# Patient Record
Sex: MALE | Race: WHITE | Employment: OTHER | URBAN - METROPOLITAN AREA
[De-identification: names, ages, dates, MRNs, and addresses within clinical notes are randomized per-mention and may not be internally consistent; named-entity substitution may affect disease eponyms.]

---

## 2019-01-01 ENCOUNTER — APPOINTMENT (OUTPATIENT)
Dept: GENERAL RADIOLOGY | Facility: HOSPITAL | Age: 84
DRG: 243 | End: 2019-01-01
Attending: INTERNAL MEDICINE
Payer: MEDICARE

## 2019-01-01 ENCOUNTER — LAB ENCOUNTER (OUTPATIENT)
Dept: LAB | Age: 84
End: 2019-01-01
Attending: UROLOGY
Payer: MEDICARE

## 2019-01-01 ENCOUNTER — SNF ADMIT/H&P (OUTPATIENT)
Dept: INTERNAL MEDICINE CLINIC | Facility: SKILLED NURSING FACILITY | Age: 84
End: 2019-01-01

## 2019-01-01 ENCOUNTER — SNF VISIT (OUTPATIENT)
Dept: INTERNAL MEDICINE CLINIC | Facility: SKILLED NURSING FACILITY | Age: 84
End: 2019-01-01

## 2019-01-01 ENCOUNTER — APPOINTMENT (OUTPATIENT)
Dept: MRI IMAGING | Facility: HOSPITAL | Age: 84
DRG: 243 | End: 2019-01-01
Attending: UROLOGY
Payer: MEDICARE

## 2019-01-01 ENCOUNTER — ANESTHESIA EVENT (OUTPATIENT)
Dept: SURGERY | Facility: HOSPITAL | Age: 84
DRG: 657 | End: 2019-01-01
Payer: MEDICARE

## 2019-01-01 ENCOUNTER — APPOINTMENT (OUTPATIENT)
Dept: CV DIAGNOSTICS | Facility: HOSPITAL | Age: 84
DRG: 243 | End: 2019-01-01
Attending: INTERNAL MEDICINE
Payer: MEDICARE

## 2019-01-01 ENCOUNTER — ANESTHESIA (OUTPATIENT)
Dept: SURGERY | Facility: HOSPITAL | Age: 84
DRG: 243 | End: 2019-01-01
Payer: MEDICARE

## 2019-01-01 ENCOUNTER — APPOINTMENT (OUTPATIENT)
Dept: INTERVENTIONAL RADIOLOGY/VASCULAR | Facility: HOSPITAL | Age: 84
DRG: 243 | End: 2019-01-01
Attending: INTERNAL MEDICINE
Payer: MEDICARE

## 2019-01-01 ENCOUNTER — APPOINTMENT (OUTPATIENT)
Dept: CT IMAGING | Facility: HOSPITAL | Age: 84
DRG: 243 | End: 2019-01-01
Attending: INTERNAL MEDICINE
Payer: MEDICARE

## 2019-01-01 ENCOUNTER — HOSPITAL ENCOUNTER (INPATIENT)
Facility: HOSPITAL | Age: 84
LOS: 3 days | Discharge: NURSING, REHAB OR LONG-TERM CARE | DRG: 657 | End: 2019-01-01
Attending: UROLOGY | Admitting: UROLOGY
Payer: MEDICARE

## 2019-01-01 ENCOUNTER — ANESTHESIA EVENT (OUTPATIENT)
Dept: SURGERY | Facility: HOSPITAL | Age: 84
DRG: 243 | End: 2019-01-01
Payer: MEDICARE

## 2019-01-01 ENCOUNTER — ANESTHESIA (OUTPATIENT)
Dept: SURGERY | Facility: HOSPITAL | Age: 84
DRG: 657 | End: 2019-01-01
Payer: MEDICARE

## 2019-01-01 ENCOUNTER — APPOINTMENT (OUTPATIENT)
Dept: GENERAL RADIOLOGY | Facility: HOSPITAL | Age: 84
DRG: 243 | End: 2019-01-01
Attending: UROLOGY
Payer: MEDICARE

## 2019-01-01 VITALS
DIASTOLIC BLOOD PRESSURE: 65 MMHG | RESPIRATION RATE: 18 BRPM | WEIGHT: 175 LBS | TEMPERATURE: 99 F | SYSTOLIC BLOOD PRESSURE: 114 MMHG | HEIGHT: 71 IN | OXYGEN SATURATION: 99 % | BODY MASS INDEX: 24.5 KG/M2 | HEART RATE: 62 BPM

## 2019-01-01 VITALS
OXYGEN SATURATION: 97 % | HEART RATE: 68 BPM | RESPIRATION RATE: 16 BRPM | HEIGHT: 69 IN | TEMPERATURE: 100 F | WEIGHT: 175.38 LBS | SYSTOLIC BLOOD PRESSURE: 102 MMHG | DIASTOLIC BLOOD PRESSURE: 65 MMHG | BODY MASS INDEX: 25.98 KG/M2

## 2019-01-01 DIAGNOSIS — R59.1 LYMPHADENOPATHY: Primary | ICD-10-CM

## 2019-01-01 DIAGNOSIS — D64.9 ANEMIA, UNSPECIFIED TYPE: ICD-10-CM

## 2019-01-01 DIAGNOSIS — C68.9 UROTHELIAL CANCER (HCC): ICD-10-CM

## 2019-01-01 DIAGNOSIS — C91.10 CLL (CHRONIC LYMPHOCYTIC LEUKEMIA) (HCC): ICD-10-CM

## 2019-01-01 DIAGNOSIS — I95.1 ORTHOSTATIC HYPOTENSION: ICD-10-CM

## 2019-01-01 DIAGNOSIS — C64.2 MALIGNANT NEOPLASM OF LEFT KIDNEY (HCC): ICD-10-CM

## 2019-01-01 DIAGNOSIS — R53.1 WEAKNESS: ICD-10-CM

## 2019-01-01 DIAGNOSIS — C64.2 RENAL CARCINOMA, LEFT (HCC): ICD-10-CM

## 2019-01-01 DIAGNOSIS — Z90.5 S/P NEPHRECTOMY: ICD-10-CM

## 2019-01-01 DIAGNOSIS — R53.1 WEAKNESS GENERALIZED: ICD-10-CM

## 2019-01-01 PROCEDURE — 80048 BASIC METABOLIC PNL TOTAL CA: CPT | Performed by: UROLOGY

## 2019-01-01 PROCEDURE — 97161 PT EVAL LOW COMPLEX 20 MIN: CPT

## 2019-01-01 PROCEDURE — 71045 X-RAY EXAM CHEST 1 VIEW: CPT | Performed by: INTERNAL MEDICINE

## 2019-01-01 PROCEDURE — 85027 COMPLETE CBC AUTOMATED: CPT | Performed by: UROLOGY

## 2019-01-01 PROCEDURE — 82607 VITAMIN B-12: CPT | Performed by: INTERNAL MEDICINE

## 2019-01-01 PROCEDURE — 99310 SBSQ NF CARE HIGH MDM 45: CPT | Performed by: NURSE PRACTITIONER

## 2019-01-01 PROCEDURE — 74183 MRI ABD W/O CNTR FLWD CNTR: CPT | Performed by: UROLOGY

## 2019-01-01 PROCEDURE — 99308 SBSQ NF CARE LOW MDM 20: CPT | Performed by: NURSE PRACTITIONER

## 2019-01-01 PROCEDURE — 85018 HEMOGLOBIN: CPT | Performed by: UROLOGY

## 2019-01-01 PROCEDURE — 85014 HEMATOCRIT: CPT | Performed by: UROLOGY

## 2019-01-01 PROCEDURE — 83010 ASSAY OF HAPTOGLOBIN QUANT: CPT | Performed by: INTERNAL MEDICINE

## 2019-01-01 PROCEDURE — 88342 IMHCHEM/IMCYTCHM 1ST ANTB: CPT | Performed by: UROLOGY

## 2019-01-01 PROCEDURE — 82728 ASSAY OF FERRITIN: CPT | Performed by: INTERNAL MEDICINE

## 2019-01-01 PROCEDURE — 30233R1 TRANSFUSION OF NONAUTOLOGOUS PLATELETS INTO PERIPHERAL VEIN, PERCUTANEOUS APPROACH: ICD-10-PCS | Performed by: INTERNAL MEDICINE

## 2019-01-01 PROCEDURE — 02HK3JZ INSERTION OF PACEMAKER LEAD INTO RIGHT VENTRICLE, PERCUTANEOUS APPROACH: ICD-10-PCS | Performed by: INTERNAL MEDICINE

## 2019-01-01 PROCEDURE — 88307 TISSUE EXAM BY PATHOLOGIST: CPT | Performed by: UROLOGY

## 2019-01-01 PROCEDURE — BT1F1ZZ FLUOROSCOPY OF LEFT KIDNEY, URETER AND BLADDER USING LOW OSMOLAR CONTRAST: ICD-10-PCS | Performed by: UROLOGY

## 2019-01-01 PROCEDURE — 97116 GAIT TRAINING THERAPY: CPT

## 2019-01-01 PROCEDURE — 99309 SBSQ NF CARE MODERATE MDM 30: CPT | Performed by: NURSE PRACTITIONER

## 2019-01-01 PROCEDURE — 86900 BLOOD TYPING SEROLOGIC ABO: CPT | Performed by: UROLOGY

## 2019-01-01 PROCEDURE — 38221 DX BONE MARROW BIOPSIES: CPT | Performed by: INTERNAL MEDICINE

## 2019-01-01 PROCEDURE — 93308 TTE F-UP OR LMTD: CPT | Performed by: INTERNAL MEDICINE

## 2019-01-01 PROCEDURE — 99232 SBSQ HOSP IP/OBS MODERATE 35: CPT | Performed by: INTERNAL MEDICINE

## 2019-01-01 PROCEDURE — 88184 FLOWCYTOMETRY/ TC 1 MARKER: CPT

## 2019-01-01 PROCEDURE — 36415 COLL VENOUS BLD VENIPUNCTURE: CPT | Performed by: UROLOGY

## 2019-01-01 PROCEDURE — 0TT14ZZ RESECTION OF LEFT KIDNEY, PERCUTANEOUS ENDOSCOPIC APPROACH: ICD-10-PCS | Performed by: UROLOGY

## 2019-01-01 PROCEDURE — 0T778DZ DILATION OF LEFT URETER WITH INTRALUMINAL DEVICE, VIA NATURAL OR ARTIFICIAL OPENING ENDOSCOPIC: ICD-10-PCS | Performed by: UROLOGY

## 2019-01-01 PROCEDURE — 36430 TRANSFUSION BLD/BLD COMPNT: CPT

## 2019-01-01 PROCEDURE — 88305 TISSUE EXAM BY PATHOLOGIST: CPT | Performed by: UROLOGY

## 2019-01-01 PROCEDURE — 0TB48ZX EXCISION OF LEFT KIDNEY PELVIS, VIA NATURAL OR ARTIFICIAL OPENING ENDOSCOPIC, DIAGNOSTIC: ICD-10-PCS | Performed by: UROLOGY

## 2019-01-01 PROCEDURE — 99152 MOD SED SAME PHYS/QHP 5/>YRS: CPT | Performed by: INTERNAL MEDICINE

## 2019-01-01 PROCEDURE — 88341 IMHCHEM/IMCYTCHM EA ADD ANTB: CPT | Performed by: UROLOGY

## 2019-01-01 PROCEDURE — 97530 THERAPEUTIC ACTIVITIES: CPT

## 2019-01-01 PROCEDURE — 82232 ASSAY OF BETA-2 PROTEIN: CPT | Performed by: INTERNAL MEDICINE

## 2019-01-01 PROCEDURE — 8E0W4CZ ROBOTIC ASSISTED PROCEDURE OF TRUNK REGION, PERCUTANEOUS ENDOSCOPIC APPROACH: ICD-10-PCS | Performed by: UROLOGY

## 2019-01-01 PROCEDURE — 83540 ASSAY OF IRON: CPT | Performed by: INTERNAL MEDICINE

## 2019-01-01 PROCEDURE — 079T3ZX DRAINAGE OF BONE MARROW, PERCUTANEOUS APPROACH, DIAGNOSTIC: ICD-10-PCS | Performed by: RADIOLOGY

## 2019-01-01 PROCEDURE — 0TP98DZ REMOVAL OF INTRALUMINAL DEVICE FROM URETER, VIA NATURAL OR ARTIFICIAL OPENING ENDOSCOPIC: ICD-10-PCS | Performed by: UROLOGY

## 2019-01-01 PROCEDURE — 77012 CT SCAN FOR NEEDLE BIOPSY: CPT | Performed by: INTERNAL MEDICINE

## 2019-01-01 PROCEDURE — 07DR3ZX EXTRACTION OF ILIAC BONE MARROW, PERCUTANEOUS APPROACH, DIAGNOSTIC: ICD-10-PCS | Performed by: RADIOLOGY

## 2019-01-01 PROCEDURE — 88108 CYTOPATH CONCENTRATE TECH: CPT | Performed by: UROLOGY

## 2019-01-01 PROCEDURE — 84466 ASSAY OF TRANSFERRIN: CPT | Performed by: INTERNAL MEDICINE

## 2019-01-01 PROCEDURE — 88237 TISSUE CULTURE BONE MARROW: CPT

## 2019-01-01 PROCEDURE — 86901 BLOOD TYPING SEROLOGIC RH(D): CPT | Performed by: UROLOGY

## 2019-01-01 PROCEDURE — 82746 ASSAY OF FOLIC ACID SERUM: CPT | Performed by: INTERNAL MEDICINE

## 2019-01-01 PROCEDURE — 88185 FLOWCYTOMETRY/TC ADD-ON: CPT

## 2019-01-01 PROCEDURE — 85018 HEMOGLOBIN: CPT | Performed by: INTERNAL MEDICINE

## 2019-01-01 PROCEDURE — 86850 RBC ANTIBODY SCREEN: CPT | Performed by: UROLOGY

## 2019-01-01 PROCEDURE — 0TP94DZ REMOVAL OF INTRALUMINAL DEVICE FROM URETER, PERCUTANEOUS ENDOSCOPIC APPROACH: ICD-10-PCS | Performed by: UROLOGY

## 2019-01-01 PROCEDURE — 36415 COLL VENOUS BLD VENIPUNCTURE: CPT

## 2019-01-01 PROCEDURE — 0JH604Z INSERTION OF PACEMAKER, SINGLE CHAMBER INTO CHEST SUBCUTANEOUS TISSUE AND FASCIA, OPEN APPROACH: ICD-10-PCS | Performed by: INTERNAL MEDICINE

## 2019-01-01 PROCEDURE — 86920 COMPATIBILITY TEST SPIN: CPT

## 2019-01-01 DEVICE — STENT URET 7F 24CM TPR TIP: Type: IMPLANTABLE DEVICE | Site: URETER | Status: FUNCTIONAL

## 2019-01-01 RX ORDER — HYDROCODONE BITARTRATE AND ACETAMINOPHEN 5; 325 MG/1; MG/1
1 TABLET ORAL AS NEEDED
Status: DISCONTINUED | OUTPATIENT
Start: 2019-01-01 | End: 2019-01-01 | Stop reason: HOSPADM

## 2019-01-01 RX ORDER — SODIUM CHLORIDE 9 MG/ML
INJECTION, SOLUTION INTRAVENOUS ONCE
Status: COMPLETED | OUTPATIENT
Start: 2019-01-01 | End: 2019-01-01

## 2019-01-01 RX ORDER — LIDOCAINE HYDROCHLORIDE ANHYDROUS AND DEXTROSE MONOHYDRATE .8; 5 G/100ML; G/100ML
INJECTION, SOLUTION INTRAVENOUS CONTINUOUS
Status: DISCONTINUED | OUTPATIENT
Start: 2019-01-01 | End: 2019-01-01 | Stop reason: HOSPADM

## 2019-01-01 RX ORDER — SODIUM CHLORIDE 9 MG/ML
INJECTION, SOLUTION INTRAVENOUS
Status: COMPLETED
Start: 2019-01-01 | End: 2019-01-01

## 2019-01-01 RX ORDER — SODIUM CHLORIDE 9 MG/ML
INJECTION, SOLUTION INTRAVENOUS CONTINUOUS
Status: DISCONTINUED | OUTPATIENT
Start: 2019-01-01 | End: 2019-01-01

## 2019-01-01 RX ORDER — MORPHINE SULFATE 10 MG/ML
6 INJECTION, SOLUTION INTRAMUSCULAR; INTRAVENOUS EVERY 10 MIN PRN
Status: DISCONTINUED | OUTPATIENT
Start: 2019-01-01 | End: 2019-01-01 | Stop reason: HOSPADM

## 2019-01-01 RX ORDER — PROCHLORPERAZINE EDISYLATE 5 MG/ML
5 INJECTION INTRAMUSCULAR; INTRAVENOUS ONCE AS NEEDED
Status: DISCONTINUED | OUTPATIENT
Start: 2019-01-01 | End: 2019-01-01 | Stop reason: HOSPADM

## 2019-01-01 RX ORDER — SODIUM CHLORIDE, SODIUM LACTATE, POTASSIUM CHLORIDE, CALCIUM CHLORIDE 600; 310; 30; 20 MG/100ML; MG/100ML; MG/100ML; MG/100ML
INJECTION, SOLUTION INTRAVENOUS CONTINUOUS
Status: DISCONTINUED | OUTPATIENT
Start: 2019-01-01 | End: 2019-01-01 | Stop reason: HOSPADM

## 2019-01-01 RX ORDER — HYDROMORPHONE HYDROCHLORIDE 1 MG/ML
0.6 INJECTION, SOLUTION INTRAMUSCULAR; INTRAVENOUS; SUBCUTANEOUS EVERY 5 MIN PRN
Status: DISCONTINUED | OUTPATIENT
Start: 2019-01-01 | End: 2019-01-01 | Stop reason: HOSPADM

## 2019-01-01 RX ORDER — ONDANSETRON 2 MG/ML
INJECTION INTRAMUSCULAR; INTRAVENOUS AS NEEDED
Status: DISCONTINUED | OUTPATIENT
Start: 2019-01-01 | End: 2019-01-01 | Stop reason: SURG

## 2019-01-01 RX ORDER — ALFUZOSIN HYDROCHLORIDE 10 MG/1
10 TABLET, EXTENDED RELEASE ORAL
Status: DISCONTINUED | OUTPATIENT
Start: 2019-01-01 | End: 2019-01-01

## 2019-01-01 RX ORDER — PHENYLEPHRINE HCL 10 MG/ML
VIAL (ML) INJECTION AS NEEDED
Status: DISCONTINUED | OUTPATIENT
Start: 2019-01-01 | End: 2019-01-01 | Stop reason: SURG

## 2019-01-01 RX ORDER — HYDROMORPHONE HYDROCHLORIDE 1 MG/ML
0.2 INJECTION, SOLUTION INTRAMUSCULAR; INTRAVENOUS; SUBCUTANEOUS EVERY 5 MIN PRN
Status: DISCONTINUED | OUTPATIENT
Start: 2019-01-01 | End: 2019-01-01 | Stop reason: HOSPADM

## 2019-01-01 RX ORDER — ACETAMINOPHEN AND CODEINE PHOSPHATE 300; 30 MG/1; MG/1
2 TABLET ORAL EVERY 4 HOURS PRN
Status: DISCONTINUED | OUTPATIENT
Start: 2019-01-01 | End: 2019-01-01

## 2019-01-01 RX ORDER — CALCIUM CARBONATE 200(500)MG
1000 TABLET,CHEWABLE ORAL 3 TIMES DAILY PRN
Qty: 60 TABLET | Refills: 0 | Status: SHIPPED | OUTPATIENT
Start: 2019-01-01 | End: 2019-01-01

## 2019-01-01 RX ORDER — LIDOCAINE HYDROCHLORIDE 20 MG/ML
INJECTION, SOLUTION EPIDURAL; INFILTRATION; INTRACAUDAL; PERINEURAL AS NEEDED
Status: DISCONTINUED | OUTPATIENT
Start: 2019-01-01 | End: 2019-01-01 | Stop reason: SURG

## 2019-01-01 RX ORDER — CLINDAMYCIN PHOSPHATE 900 MG/50ML
INJECTION INTRAVENOUS
Status: COMPLETED
Start: 2019-01-01 | End: 2019-01-01

## 2019-01-01 RX ORDER — ONDANSETRON 2 MG/ML
4 INJECTION INTRAMUSCULAR; INTRAVENOUS ONCE AS NEEDED
Status: DISCONTINUED | OUTPATIENT
Start: 2019-01-01 | End: 2019-01-01 | Stop reason: HOSPADM

## 2019-01-01 RX ORDER — ACETAMINOPHEN AND CODEINE PHOSPHATE 300; 30 MG/1; MG/1
1 TABLET ORAL EVERY 4 HOURS PRN
Status: DISCONTINUED | OUTPATIENT
Start: 2019-01-01 | End: 2019-01-01

## 2019-01-01 RX ORDER — LIDOCAINE HYDROCHLORIDE 10 MG/ML
INJECTION, SOLUTION EPIDURAL; INFILTRATION; INTRACAUDAL; PERINEURAL AS NEEDED
Status: DISCONTINUED | OUTPATIENT
Start: 2019-01-01 | End: 2019-01-01 | Stop reason: SURG

## 2019-01-01 RX ORDER — CLINDAMYCIN PHOSPHATE 900 MG/50ML
900 INJECTION INTRAVENOUS ONCE
Status: COMPLETED | OUTPATIENT
Start: 2019-01-01 | End: 2019-01-01

## 2019-01-01 RX ORDER — EPHEDRINE SULFATE 50 MG/ML
INJECTION, SOLUTION INTRAVENOUS AS NEEDED
Status: DISCONTINUED | OUTPATIENT
Start: 2019-01-01 | End: 2019-01-01 | Stop reason: SURG

## 2019-01-01 RX ORDER — MORPHINE SULFATE 2 MG/ML
1 INJECTION, SOLUTION INTRAMUSCULAR; INTRAVENOUS EVERY 2 HOUR PRN
Status: DISCONTINUED | OUTPATIENT
Start: 2019-01-01 | End: 2019-01-01

## 2019-01-01 RX ORDER — NALOXONE HYDROCHLORIDE 0.4 MG/ML
80 INJECTION, SOLUTION INTRAMUSCULAR; INTRAVENOUS; SUBCUTANEOUS AS NEEDED
Status: DISCONTINUED | OUTPATIENT
Start: 2019-01-01 | End: 2019-01-01 | Stop reason: HOSPADM

## 2019-01-01 RX ORDER — HYDROMORPHONE HYDROCHLORIDE 1 MG/ML
0.4 INJECTION, SOLUTION INTRAMUSCULAR; INTRAVENOUS; SUBCUTANEOUS EVERY 5 MIN PRN
Status: DISCONTINUED | OUTPATIENT
Start: 2019-01-01 | End: 2019-01-01 | Stop reason: HOSPADM

## 2019-01-01 RX ORDER — MORPHINE SULFATE 4 MG/ML
4 INJECTION, SOLUTION INTRAMUSCULAR; INTRAVENOUS EVERY 10 MIN PRN
Status: DISCONTINUED | OUTPATIENT
Start: 2019-01-01 | End: 2019-01-01 | Stop reason: HOSPADM

## 2019-01-01 RX ORDER — MORPHINE SULFATE 2 MG/ML
2 INJECTION, SOLUTION INTRAMUSCULAR; INTRAVENOUS EVERY 10 MIN PRN
Status: DISCONTINUED | OUTPATIENT
Start: 2019-01-01 | End: 2019-01-01 | Stop reason: HOSPADM

## 2019-01-01 RX ORDER — MIDODRINE HYDROCHLORIDE 5 MG/1
5 TABLET ORAL 3 TIMES DAILY
Qty: 90 TABLET | Refills: 0 | Status: ON HOLD | OUTPATIENT
Start: 2019-01-01 | End: 2020-01-01

## 2019-01-01 RX ORDER — HALOPERIDOL 5 MG/ML
0.25 INJECTION INTRAMUSCULAR ONCE AS NEEDED
Status: DISCONTINUED | OUTPATIENT
Start: 2019-01-01 | End: 2019-01-01 | Stop reason: HOSPADM

## 2019-01-01 RX ORDER — MIDODRINE HYDROCHLORIDE 5 MG/1
2.5 TABLET ORAL 3 TIMES DAILY
Status: DISCONTINUED | OUTPATIENT
Start: 2019-01-01 | End: 2019-01-01

## 2019-01-01 RX ORDER — ONDANSETRON 2 MG/ML
4 INJECTION INTRAMUSCULAR; INTRAVENOUS EVERY 6 HOURS PRN
Status: DISCONTINUED | OUTPATIENT
Start: 2019-01-01 | End: 2019-01-01

## 2019-01-01 RX ORDER — MIDAZOLAM HYDROCHLORIDE 1 MG/ML
1 INJECTION INTRAMUSCULAR; INTRAVENOUS EVERY 5 MIN PRN
Status: DISCONTINUED | OUTPATIENT
Start: 2019-01-01 | End: 2019-01-01

## 2019-01-01 RX ORDER — LIDOCAINE HYDROCHLORIDE 20 MG/ML
JELLY TOPICAL AS NEEDED
Status: DISCONTINUED | OUTPATIENT
Start: 2019-01-01 | End: 2019-01-01 | Stop reason: HOSPADM

## 2019-01-01 RX ORDER — HYDROCODONE BITARTRATE AND ACETAMINOPHEN 5; 325 MG/1; MG/1
2 TABLET ORAL AS NEEDED
Status: DISCONTINUED | OUTPATIENT
Start: 2019-01-01 | End: 2019-01-01 | Stop reason: HOSPADM

## 2019-01-01 RX ORDER — HEPARIN SODIUM 5000 [USP'U]/ML
5000 INJECTION, SOLUTION INTRAVENOUS; SUBCUTANEOUS ONCE
Status: COMPLETED | OUTPATIENT
Start: 2019-01-01 | End: 2019-01-01

## 2019-01-01 RX ORDER — MORPHINE SULFATE 4 MG/ML
2 INJECTION, SOLUTION INTRAMUSCULAR; INTRAVENOUS EVERY 10 MIN PRN
Status: DISCONTINUED | OUTPATIENT
Start: 2019-01-01 | End: 2019-01-01 | Stop reason: HOSPADM

## 2019-01-01 RX ORDER — SODIUM CHLORIDE, SODIUM LACTATE, POTASSIUM CHLORIDE, CALCIUM CHLORIDE 600; 310; 30; 20 MG/100ML; MG/100ML; MG/100ML; MG/100ML
INJECTION, SOLUTION INTRAVENOUS CONTINUOUS PRN
Status: DISCONTINUED | OUTPATIENT
Start: 2019-01-01 | End: 2019-01-01 | Stop reason: SURG

## 2019-01-01 RX ORDER — MIDODRINE HYDROCHLORIDE 5 MG/1
5 TABLET ORAL 3 TIMES DAILY
Status: DISCONTINUED | OUTPATIENT
Start: 2019-01-01 | End: 2019-01-01

## 2019-01-01 RX ORDER — FAMOTIDINE 20 MG/1
20 TABLET ORAL ONCE
Status: DISCONTINUED | OUTPATIENT
Start: 2019-01-01 | End: 2019-01-01 | Stop reason: HOSPADM

## 2019-01-01 RX ORDER — METOCLOPRAMIDE 10 MG/1
10 TABLET ORAL ONCE
Status: DISCONTINUED | OUTPATIENT
Start: 2019-01-01 | End: 2019-01-01 | Stop reason: HOSPADM

## 2019-01-01 RX ORDER — BACITRACIN 50000 [USP'U]/1
INJECTION, POWDER, LYOPHILIZED, FOR SOLUTION INTRAMUSCULAR
Status: COMPLETED
Start: 2019-01-01 | End: 2019-01-01

## 2019-01-01 RX ORDER — PANTOPRAZOLE SODIUM 40 MG/1
40 TABLET, DELAYED RELEASE ORAL
Status: DISCONTINUED | OUTPATIENT
Start: 2019-01-01 | End: 2019-01-01

## 2019-01-01 RX ORDER — CLINDAMYCIN PHOSPHATE 900 MG/50ML
900 INJECTION INTRAVENOUS EVERY 8 HOURS
Status: COMPLETED | OUTPATIENT
Start: 2019-01-01 | End: 2019-01-01

## 2019-01-01 RX ORDER — CEFAZOLIN SODIUM/WATER 2 G/20 ML
SYRINGE (ML) INTRAVENOUS
Status: DISCONTINUED
Start: 2019-01-01 | End: 2019-01-01 | Stop reason: WASHOUT

## 2019-01-01 RX ORDER — ENOXAPARIN SODIUM 100 MG/ML
40 INJECTION SUBCUTANEOUS EVERY 24 HOURS
Status: DISCONTINUED | OUTPATIENT
Start: 2019-01-01 | End: 2019-01-01

## 2019-01-01 RX ORDER — MORPHINE SULFATE 2 MG/ML
1 INJECTION, SOLUTION INTRAMUSCULAR; INTRAVENOUS
Status: DISCONTINUED | OUTPATIENT
Start: 2019-01-01 | End: 2019-01-01

## 2019-01-01 RX ORDER — POLYETHYLENE GLYCOL 3350 17 G/17G
17 POWDER, FOR SOLUTION ORAL DAILY PRN
COMMUNITY
End: 2019-01-01

## 2019-01-01 RX ORDER — ROCURONIUM BROMIDE 10 MG/ML
INJECTION, SOLUTION INTRAVENOUS AS NEEDED
Status: DISCONTINUED | OUTPATIENT
Start: 2019-01-01 | End: 2019-01-01 | Stop reason: SURG

## 2019-01-01 RX ORDER — ATORVASTATIN CALCIUM 20 MG/1
20 TABLET, FILM COATED ORAL NIGHTLY
Status: DISCONTINUED | OUTPATIENT
Start: 2019-01-01 | End: 2019-01-01

## 2019-01-01 RX ORDER — MIDAZOLAM HYDROCHLORIDE 1 MG/ML
INJECTION INTRAMUSCULAR; INTRAVENOUS
Status: DISPENSED
Start: 2019-01-01 | End: 2019-01-01

## 2019-01-01 RX ORDER — FLUMAZENIL 0.1 MG/ML
0.2 INJECTION, SOLUTION INTRAVENOUS AS NEEDED
Status: DISCONTINUED | OUTPATIENT
Start: 2019-01-01 | End: 2019-01-01

## 2019-01-01 RX ORDER — SODIUM CHLORIDE 0.9 % (FLUSH) 0.9 %
10 SYRINGE (ML) INJECTION AS NEEDED
Status: DISCONTINUED | OUTPATIENT
Start: 2019-01-01 | End: 2019-01-01

## 2019-01-01 RX ORDER — ACETAMINOPHEN 325 MG/1
650 TABLET ORAL EVERY 4 HOURS PRN
Status: DISCONTINUED | OUTPATIENT
Start: 2019-01-01 | End: 2019-01-01

## 2019-01-01 RX ORDER — SODIUM CHLORIDE, SODIUM LACTATE, POTASSIUM CHLORIDE, CALCIUM CHLORIDE 600; 310; 30; 20 MG/100ML; MG/100ML; MG/100ML; MG/100ML
INJECTION, SOLUTION INTRAVENOUS CONTINUOUS
Status: DISCONTINUED | OUTPATIENT
Start: 2019-01-01 | End: 2019-01-01

## 2019-01-01 RX ORDER — CIPROFLOXACIN 2 MG/ML
400 INJECTION, SOLUTION INTRAVENOUS
Status: DISCONTINUED | OUTPATIENT
Start: 2019-01-01 | End: 2019-01-01

## 2019-01-01 RX ORDER — DOCUSATE SODIUM 100 MG/1
100 CAPSULE, LIQUID FILLED ORAL 2 TIMES DAILY
Status: DISCONTINUED | OUTPATIENT
Start: 2019-01-01 | End: 2019-01-01

## 2019-01-01 RX ORDER — NALOXONE HYDROCHLORIDE 0.4 MG/ML
80 INJECTION, SOLUTION INTRAMUSCULAR; INTRAVENOUS; SUBCUTANEOUS AS NEEDED
Status: DISCONTINUED | OUTPATIENT
Start: 2019-01-01 | End: 2019-01-01

## 2019-01-01 RX ORDER — BUPIVACAINE HYDROCHLORIDE 2.5 MG/ML
INJECTION, SOLUTION EPIDURAL; INFILTRATION; INTRACAUDAL AS NEEDED
Status: DISCONTINUED | OUTPATIENT
Start: 2019-01-01 | End: 2019-01-01 | Stop reason: HOSPADM

## 2019-01-01 RX ORDER — ACETAMINOPHEN 325 MG/1
650 TABLET ORAL EVERY 4 HOURS PRN
Qty: 30 TABLET | Refills: 0 | Status: SHIPPED | OUTPATIENT
Start: 2019-01-01

## 2019-01-01 RX ORDER — ACETAMINOPHEN 500 MG
1000 TABLET ORAL ONCE
Status: COMPLETED | OUTPATIENT
Start: 2019-01-01 | End: 2019-01-01

## 2019-01-01 RX ORDER — LIDOCAINE HYDROCHLORIDE AND EPINEPHRINE 10; 10 MG/ML; UG/ML
INJECTION, SOLUTION INFILTRATION; PERINEURAL
Status: COMPLETED
Start: 2019-01-01 | End: 2019-01-01

## 2019-01-01 RX ORDER — MIDAZOLAM HYDROCHLORIDE 1 MG/ML
INJECTION INTRAMUSCULAR; INTRAVENOUS
Status: COMPLETED
Start: 2019-01-01 | End: 2019-01-01

## 2019-01-01 RX ADMIN — PHENYLEPHRINE HCL 50 MCG: 10 MG/ML VIAL (ML) INJECTION at 16:50:00

## 2019-01-01 RX ADMIN — SODIUM CHLORIDE, SODIUM LACTATE, POTASSIUM CHLORIDE, CALCIUM CHLORIDE: 600; 310; 30; 20 INJECTION, SOLUTION INTRAVENOUS at 07:37:00

## 2019-01-01 RX ADMIN — ROCURONIUM BROMIDE 40 MG: 10 INJECTION, SOLUTION INTRAVENOUS at 14:44:00

## 2019-01-01 RX ADMIN — ONDANSETRON 4 MG: 2 INJECTION INTRAMUSCULAR; INTRAVENOUS at 08:05:00

## 2019-01-01 RX ADMIN — PHENYLEPHRINE HCL 100 MCG: 10 MG/ML VIAL (ML) INJECTION at 16:18:00

## 2019-01-01 RX ADMIN — EPHEDRINE SULFATE 10 MG: 50 INJECTION, SOLUTION INTRAVENOUS at 08:17:00

## 2019-01-01 RX ADMIN — CIPROFLOXACIN 400 MG: 2 INJECTION, SOLUTION INTRAVENOUS at 07:51:00

## 2019-01-01 RX ADMIN — EPHEDRINE SULFATE 10 MG: 50 INJECTION, SOLUTION INTRAVENOUS at 08:07:00

## 2019-01-01 RX ADMIN — EPHEDRINE SULFATE 10 MG: 50 INJECTION, SOLUTION INTRAVENOUS at 07:55:00

## 2019-01-01 RX ADMIN — LIDOCAINE HYDROCHLORIDE 25 MG: 10 INJECTION, SOLUTION EPIDURAL; INFILTRATION; INTRACAUDAL; PERINEURAL at 07:48:00

## 2019-01-01 RX ADMIN — EPHEDRINE SULFATE 10 MG: 50 INJECTION, SOLUTION INTRAVENOUS at 15:18:00

## 2019-01-01 RX ADMIN — SODIUM CHLORIDE, SODIUM LACTATE, POTASSIUM CHLORIDE, CALCIUM CHLORIDE: 600; 310; 30; 20 INJECTION, SOLUTION INTRAVENOUS at 08:43:00

## 2019-01-01 RX ADMIN — LIDOCAINE HYDROCHLORIDE 40 MG: 20 INJECTION, SOLUTION EPIDURAL; INFILTRATION; INTRACAUDAL; PERINEURAL at 14:42:00

## 2019-01-01 RX ADMIN — CLINDAMYCIN PHOSPHATE 900 MG: 900 INJECTION INTRAVENOUS at 15:00:00

## 2019-01-01 RX ADMIN — PHENYLEPHRINE HCL 100 MCG: 10 MG/ML VIAL (ML) INJECTION at 15:04:00

## 2019-05-26 ENCOUNTER — APPOINTMENT (OUTPATIENT)
Dept: GENERAL RADIOLOGY | Facility: HOSPITAL | Age: 84
DRG: 243 | End: 2019-05-26
Attending: EMERGENCY MEDICINE
Payer: MEDICARE

## 2019-05-26 ENCOUNTER — HOSPITAL ENCOUNTER (INPATIENT)
Facility: HOSPITAL | Age: 84
LOS: 16 days | Discharge: NURSING, REHAB OR LONG-TERM CARE | DRG: 243 | End: 2019-01-01
Attending: EMERGENCY MEDICINE | Admitting: INTERNAL MEDICINE
Payer: MEDICARE

## 2019-05-26 DIAGNOSIS — N13.30 HYDRONEPHROSIS, UNSPECIFIED HYDRONEPHROSIS TYPE: ICD-10-CM

## 2019-05-26 DIAGNOSIS — R53.1 WEAKNESS GENERALIZED: ICD-10-CM

## 2019-05-26 DIAGNOSIS — D64.9 SEVERE ANEMIA: Primary | ICD-10-CM

## 2019-05-26 DIAGNOSIS — D72.820 PERSISTENT LYMPHOCYTOSIS: ICD-10-CM

## 2019-05-26 DIAGNOSIS — N20.1 URETERAL STONE: ICD-10-CM

## 2019-05-26 PROCEDURE — 30233R1 TRANSFUSION OF NONAUTOLOGOUS PLATELETS INTO PERIPHERAL VEIN, PERCUTANEOUS APPROACH: ICD-10-PCS | Performed by: INTERNAL MEDICINE

## 2019-05-26 PROCEDURE — 71045 X-RAY EXAM CHEST 1 VIEW: CPT | Performed by: EMERGENCY MEDICINE

## 2019-05-26 RX ORDER — ATORVASTATIN CALCIUM 20 MG/1
20 TABLET, FILM COATED ORAL DAILY
Status: DISCONTINUED | OUTPATIENT
Start: 2019-05-26 | End: 2019-01-01

## 2019-05-26 RX ORDER — ASCORBIC ACID 500 MG
1000 TABLET ORAL DAILY
Status: DISCONTINUED | OUTPATIENT
Start: 2019-05-27 | End: 2019-01-01

## 2019-05-26 RX ORDER — PANTOPRAZOLE SODIUM 40 MG/1
40 TABLET, DELAYED RELEASE ORAL
Status: DISCONTINUED | OUTPATIENT
Start: 2019-05-27 | End: 2019-01-01

## 2019-05-26 RX ORDER — FUROSEMIDE 10 MG/ML
20 INJECTION INTRAMUSCULAR; INTRAVENOUS ONCE
Status: COMPLETED | OUTPATIENT
Start: 2019-05-26 | End: 2019-05-26

## 2019-05-26 RX ORDER — CALCIUM CARBONATE 200(500)MG
1000 TABLET,CHEWABLE ORAL 3 TIMES DAILY PRN
Status: DISCONTINUED | OUTPATIENT
Start: 2019-05-26 | End: 2019-01-01

## 2019-05-26 NOTE — PLAN OF CARE
Patient admitted with low hemoglobin. PRBCs ordered and first unit started. Plan for second unit. Patient is alert and oriented and very pleasant. Wife bedside this afternoon. Cardiac diet with appetite. Patient complains of GERDlike symptoms.  Will adriana values  - Obtain nutritional consult as needed  - Optimize oral hygiene and moisture  - Encourage food from home; allow for food preferences  - Enhance eating environment  Outcome: Progressing     Problem: SKIN/TISSUE INTEGRITY - ADULT  Goal: Skin integrit patient with low platelets)  INTERVENTIONS:  - Avoid intramuscular injections, enemas and rectal medication administration  - Ensure safe mobilization of patient  - Hold pressure on venipuncture sites to achieve adequate hemostasis  - Assess for signs and

## 2019-05-26 NOTE — ED PROVIDER NOTES
Patient Seen in: [facility] AND [facility] Emergency Department    History   Patient presents with:  Weakness  Fatigue (constitutional, neurologic)    Stated Complaint: weakness    HPI    Patient presents to the emergency department with complaint of weakness. distress. Vital signs noted. Eye:  No scleral icterus. Eyelids appear normal, no lesions. Cardiovascular:  Normal S1 and S2, no murmur, regular, with good peripheral perfusion.   He has 2+ edema to lower extremities which he reports is chronic and actua All other components within normal limits   CBC W/ DIFFERENTIAL - Abnormal; Notable for the following components:    WBC 16.0 (*)     RBC 2.43 (*)     HGB 4.9 (*)     HCT 18.6 (*)     MCV 76.5 (*)     MCH 20.2 (*)     MCHC 26.3 (*)     RDW-SD 52.8 (*) Clinical Impression:  Severe anemia  (primary encounter diagnosis)  Weakness generalized    Disposition:  Admit    Follow-up:  No follow-up provider specified.     Medications Prescribed:  Current Discharge Medication List        Present on Admission

## 2019-05-26 NOTE — ED INITIAL ASSESSMENT (HPI)
Pt c/o feeling weak, fatigued, SOB with exertion, and intermittent chest pain described as \"heartburn\" no CP now.

## 2019-05-27 ENCOUNTER — APPOINTMENT (OUTPATIENT)
Dept: ULTRASOUND IMAGING | Facility: HOSPITAL | Age: 84
DRG: 243 | End: 2019-05-27
Attending: INTERNAL MEDICINE
Payer: MEDICARE

## 2019-05-27 PROCEDURE — 76775 US EXAM ABDO BACK WALL LIM: CPT | Performed by: INTERNAL MEDICINE

## 2019-05-27 NOTE — PLAN OF CARE
Problem: Patient Centered Care  Goal: Patient preferences are identified and integrated in the patient's plan of care  Description  Interventions:  - What would you like us to know as we care for you?  Have a history of kidney stones, passed one about a m facilitate oxygenation and minimize respiratory effort  - Oxygen supplementation based on oxygen saturation or ABGs  - Provide Smoking Cessation handout, if applicable  - Encourage broncho-pulmonary hygiene including cough, deep breathe, Incentive Spiromet Skin integrity remains intact  Description  INTERVENTIONS  - Assess and document risk factors for pressure ulcer development  - Assess and document skin integrity  - Monitor for areas of redness and/or skin breakdown  - Initiate interventions, skin care al
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
## 2019-05-27 NOTE — CONSULTS
Hematology/Oncology Consult Note        NAME: Blaise Waggoner - ROOM: -A - MRN: [id number] - Age: 80year old - : 1927    Reason for Consult:  Anemia    Patient is a 80 y.o male who presents on this admission with several day history of dy 20.2*  --   --    MCHC 26.3*  --   --    RDW 19.2*  --   --    NEPRELIM 4.89  --   --    WBC 16.0*  --   --    .0  --   --     < > = values in this interval not displayed.      Recent Labs   Lab 05/26/19  1224   *   BUN 25*   CREATSERUM 1.67*

## 2019-05-28 ENCOUNTER — APPOINTMENT (OUTPATIENT)
Dept: PHYSICAL THERAPY | Facility: HOSPITAL | Age: 84
DRG: 243 | End: 2019-05-28
Attending: INTERNAL MEDICINE
Payer: MEDICARE

## 2019-05-28 ENCOUNTER — APPOINTMENT (OUTPATIENT)
Dept: CT IMAGING | Facility: HOSPITAL | Age: 84
DRG: 243 | End: 2019-05-28
Attending: INTERNAL MEDICINE
Payer: MEDICARE

## 2019-05-28 ENCOUNTER — APPOINTMENT (OUTPATIENT)
Dept: CV DIAGNOSTICS | Facility: HOSPITAL | Age: 84
DRG: 243 | End: 2019-05-28
Attending: INTERNAL MEDICINE
Payer: MEDICARE

## 2019-05-28 PROCEDURE — 93306 TTE W/DOPPLER COMPLETE: CPT | Performed by: INTERNAL MEDICINE

## 2019-05-28 PROCEDURE — 74177 CT ABD & PELVIS W/CONTRAST: CPT | Performed by: INTERNAL MEDICINE

## 2019-05-28 NOTE — CONSULTS
[facility]    PATIENT'S NAME: Tito Ramirez   ATTENDING PHYSICIAN: Vlad Cain MD   CONSULTING PHYSICIAN: Margareth oSl DO   PATIENT ACCOUNT#:   [id number]    LOCATION:  [address] [place] [address] RECORD #:   [id number]       DATE OF BIRTH:  1 intraventricular conduction delay. IMPRESSION:    1. Asymptomatic bradycardia with what appears to be a short episode of sick sinus syndrome. 2.   Symptomatic anemia, possibly gastrointestinal bleed. There is iron deficiency noted.      RECOMMENDATIO

## 2019-05-28 NOTE — CARDIAC REHAB
Order for cardiac rehab received. Based on assessment, he is not a candidate for such. Will notify ordering physician.

## 2019-05-28 NOTE — PROGRESS NOTES
cardiology progress note    24 h events A PAUSE 5 s at 2 AM      Current Facility-Administered Medications:  iron sucrose (VENOFER) IV Push 200 mg 200 mg Intravenous Daily Anant Alaniz  mg at 05/28/19 0844   Vitamin C tab 1,000 mg 1,000 mg Oral Da 5.5*         Telemetry strip reviewed as described above. EKG demonstrating sinus rhythm, first-degree AV block, nonspecific intraventricular conduction delay. IMPRESSION:    1.    Asymptomatic bradycardia with what appears to be a short episode of si

## 2019-05-28 NOTE — PLAN OF CARE
Problem: Patient Centered Care  Goal: Patient preferences are identified and integrated in the patient's plan of care  Description  Interventions:  - What would you like us to know as we care for you?  [place] 24 place with wife  - Provide timely, complete, and for changes in respiratory status  - Assess for changes in mentation and behavior  - Position to facilitate oxygenation and minimize respiratory effort  - Oxygen supplementation based on oxygen saturation or ABGs  - Provide Smoking Cessation handout, if ap puncture sites for bleeding and/or hematoma  - Assess quality of pulses, skin color and temperature  - Assess for signs of decreased coronary artery perfusion - ex.  Angina  - Evaluate fluid balance, assess for edema, trend weights  Outcome: Progressing Progressing     Problem: GENITOURINARY - ADULT  Goal: Absence of urinary retention  Description  INTERVENTIONS:  - Assess patient?s ability to void and empty bladder  - Monitor intake/output and perform bladder scan as needed  - Follow urinary retention pr

## 2019-05-28 NOTE — PHYSICAL THERAPY NOTE
PHYSICAL THERAPY EVALUATION - INPATIENT     Room Number: 330/330-A  Evaluation Date: 5/28/2019  Type of Evaluation: Initial   Physician Order: PT Eval and Treat    Presenting Problem: severe anemia (HGB 4.9 on admission); generalized weakness, SOB, chest Recommendations: Home with home health PT(w/ increased assist/support & transition to OPPT)    PLAN  PT Treatment Plan: Bed mobility;Transfer training;Gait training;Strengthening;Patient education; Family education; Endurance; Energy conservation; Body mechani Fall Risk: Standard fall risk    WEIGHT BEARING RESTRICTION                   PAIN ASSESSMENT             COGNITION  · Following Commands:  follows multistep commands with increased time and follows multistep commands with repetition      BALANCE  Static cane - straight     Goal #2  Current Status    Goal #3 Patient is able to ambulate 300 feet with assist device: cane - straight at assistance level: modified independent   Goal #3   Current Status    Goal #4 Patient to demonstrate independence with home ac

## 2019-05-28 NOTE — PROGRESS NOTES
Double RN skin check done prior to transfer off Unit. Skin check performed by this RN and FIGUEROA Saravia. Wounds are as follows: No wounds noted. Will remain available for any further questions or concerns.

## 2019-05-28 NOTE — PLAN OF CARE
Problem: Patient Centered Care  Goal: Patient preferences are identified and integrated in the patient's plan of care  Description  Interventions:  - What would you like us to know as we care for you?  LIVE AT [address] [place] [address] [address] WIFE  - Provide timely, co facilitate oxygenation and minimize respiratory effort  - Oxygen supplementation based on oxygen saturation or ABGs  - Provide Smoking Cessation handout, if applicable  - Encourage broncho-pulmonary hygiene including cough, deep breathe, Incentive Spiromet Skin integrity remains intact  Description  INTERVENTIONS  - Assess and document risk factors for pressure ulcer development  - Assess and document skin integrity  - Monitor for areas of redness and/or skin breakdown  - Initiate interventions, skin care al

## 2019-05-28 NOTE — PLAN OF CARE
Problem: Patient Centered Care  Goal: Patient preferences are identified and integrated in the patient's plan of care  Description  Interventions:  - What would you like us to know as we care for you?   - Provide timely, complete, and accurate informatio Smoking Cessation handout, if applicable  - Encourage broncho-pulmonary hygiene including cough, deep breathe, Incentive Spirometry  - Assess the need for suctioning and perform as needed  - Assess and instruct to report SOB or any respiratory difficulty Assess and document skin integrity  - Monitor for areas of redness and/or skin breakdown  - Initiate interventions, skin care algorithm/standards of care as needed  Outcome: Progressing     Problem: HEMATOLOGIC - ADULT  Goal: Maintains hematologic stabilit

## 2019-05-28 NOTE — PROGRESS NOTES
Hematology/Oncology follow up note      No acute events overnight  Feels better with blood transfusion   Renal u/s with hydro     Past Medical History:   Diagnosis Date   • Calculus of kidney    • Esophageal reflux    • Hearing impairment     Right side he secondary to iron deficiency  - stool occultpending  - possibly secondary to hematuria   - no evidence of hemolysis     Hematuria  - since April  - has outside urologist, has not had a cystoscopy for evaluation   - u/s with moderate hydro  - will send for

## 2019-05-29 ENCOUNTER — ANESTHESIA (OUTPATIENT)
Dept: SURGERY | Facility: HOSPITAL | Age: 84
DRG: 243 | End: 2019-05-29
Payer: MEDICARE

## 2019-05-29 ENCOUNTER — APPOINTMENT (OUTPATIENT)
Dept: GENERAL RADIOLOGY | Facility: HOSPITAL | Age: 84
DRG: 243 | End: 2019-05-29
Attending: UROLOGY
Payer: MEDICARE

## 2019-05-29 ENCOUNTER — ANESTHESIA EVENT (OUTPATIENT)
Dept: SURGERY | Facility: HOSPITAL | Age: 84
DRG: 243 | End: 2019-05-29
Payer: MEDICARE

## 2019-05-29 PROCEDURE — 0T778DZ DILATION OF LEFT URETER WITH INTRALUMINAL DEVICE, VIA NATURAL OR ARTIFICIAL OPENING ENDOSCOPIC: ICD-10-PCS | Performed by: UROLOGY

## 2019-05-29 PROCEDURE — 3E1K88Z IRRIGATION OF GENITOURINARY TRACT USING IRRIGATING SUBSTANCE, VIA NATURAL OR ARTIFICIAL OPENING ENDOSCOPIC: ICD-10-PCS | Performed by: UROLOGY

## 2019-05-29 PROCEDURE — BT1F1ZZ FLUOROSCOPY OF LEFT KIDNEY, URETER AND BLADDER USING LOW OSMOLAR CONTRAST: ICD-10-PCS | Performed by: UROLOGY

## 2019-05-29 PROCEDURE — 0TCB8ZZ EXTIRPATION OF MATTER FROM BLADDER, VIA NATURAL OR ARTIFICIAL OPENING ENDOSCOPIC: ICD-10-PCS | Performed by: UROLOGY

## 2019-05-29 DEVICE — STENT URET 6F 24CM ULSMTH: Type: IMPLANTABLE DEVICE | Status: FUNCTIONAL

## 2019-05-29 RX ORDER — EPHEDRINE SULFATE 50 MG/ML
INJECTION, SOLUTION INTRAVENOUS AS NEEDED
Status: DISCONTINUED | OUTPATIENT
Start: 2019-05-29 | End: 2019-05-29 | Stop reason: SURG

## 2019-05-29 RX ORDER — MORPHINE SULFATE 4 MG/ML
4 INJECTION, SOLUTION INTRAMUSCULAR; INTRAVENOUS EVERY 10 MIN PRN
Status: DISCONTINUED | OUTPATIENT
Start: 2019-05-29 | End: 2019-05-29 | Stop reason: HOSPADM

## 2019-05-29 RX ORDER — SODIUM CHLORIDE 9 MG/ML
INJECTION, SOLUTION INTRAVENOUS CONTINUOUS PRN
Status: DISCONTINUED | OUTPATIENT
Start: 2019-05-29 | End: 2019-05-29 | Stop reason: SURG

## 2019-05-29 RX ORDER — MORPHINE SULFATE 2 MG/ML
2 INJECTION, SOLUTION INTRAMUSCULAR; INTRAVENOUS EVERY 10 MIN PRN
Status: DISCONTINUED | OUTPATIENT
Start: 2019-05-29 | End: 2019-05-29 | Stop reason: HOSPADM

## 2019-05-29 RX ORDER — ONDANSETRON 2 MG/ML
INJECTION INTRAMUSCULAR; INTRAVENOUS AS NEEDED
Status: DISCONTINUED | OUTPATIENT
Start: 2019-05-29 | End: 2019-05-29 | Stop reason: SURG

## 2019-05-29 RX ORDER — HYDROMORPHONE HYDROCHLORIDE 1 MG/ML
0.6 INJECTION, SOLUTION INTRAMUSCULAR; INTRAVENOUS; SUBCUTANEOUS EVERY 5 MIN PRN
Status: DISCONTINUED | OUTPATIENT
Start: 2019-05-29 | End: 2019-05-29 | Stop reason: HOSPADM

## 2019-05-29 RX ORDER — HYDROMORPHONE HYDROCHLORIDE 1 MG/ML
0.2 INJECTION, SOLUTION INTRAMUSCULAR; INTRAVENOUS; SUBCUTANEOUS EVERY 5 MIN PRN
Status: DISCONTINUED | OUTPATIENT
Start: 2019-05-29 | End: 2019-05-29 | Stop reason: HOSPADM

## 2019-05-29 RX ORDER — SODIUM CHLORIDE, SODIUM LACTATE, POTASSIUM CHLORIDE, CALCIUM CHLORIDE 600; 310; 30; 20 MG/100ML; MG/100ML; MG/100ML; MG/100ML
INJECTION, SOLUTION INTRAVENOUS CONTINUOUS
Status: DISCONTINUED | OUTPATIENT
Start: 2019-05-29 | End: 2019-05-29 | Stop reason: HOSPADM

## 2019-05-29 RX ORDER — HYDROCODONE BITARTRATE AND ACETAMINOPHEN 5; 325 MG/1; MG/1
2 TABLET ORAL AS NEEDED
Status: DISCONTINUED | OUTPATIENT
Start: 2019-05-29 | End: 2019-05-29 | Stop reason: HOSPADM

## 2019-05-29 RX ORDER — NALOXONE HYDROCHLORIDE 0.4 MG/ML
80 INJECTION, SOLUTION INTRAMUSCULAR; INTRAVENOUS; SUBCUTANEOUS AS NEEDED
Status: DISCONTINUED | OUTPATIENT
Start: 2019-05-29 | End: 2019-05-29 | Stop reason: HOSPADM

## 2019-05-29 RX ORDER — PROCHLORPERAZINE EDISYLATE 5 MG/ML
5 INJECTION INTRAMUSCULAR; INTRAVENOUS ONCE AS NEEDED
Status: DISCONTINUED | OUTPATIENT
Start: 2019-05-29 | End: 2019-05-29 | Stop reason: HOSPADM

## 2019-05-29 RX ORDER — CIPROFLOXACIN 2 MG/ML
400 INJECTION, SOLUTION INTRAVENOUS
Status: DISCONTINUED | OUTPATIENT
Start: 2019-05-29 | End: 2019-05-31

## 2019-05-29 RX ORDER — SODIUM CHLORIDE 0.9 % (FLUSH) 0.9 %
10 SYRINGE (ML) INJECTION AS NEEDED
Status: DISCONTINUED | OUTPATIENT
Start: 2019-05-29 | End: 2019-01-01

## 2019-05-29 RX ORDER — HYDROCODONE BITARTRATE AND ACETAMINOPHEN 5; 325 MG/1; MG/1
1 TABLET ORAL AS NEEDED
Status: DISCONTINUED | OUTPATIENT
Start: 2019-05-29 | End: 2019-05-29 | Stop reason: HOSPADM

## 2019-05-29 RX ORDER — HYDROMORPHONE HYDROCHLORIDE 1 MG/ML
0.4 INJECTION, SOLUTION INTRAMUSCULAR; INTRAVENOUS; SUBCUTANEOUS EVERY 5 MIN PRN
Status: DISCONTINUED | OUTPATIENT
Start: 2019-05-29 | End: 2019-05-29 | Stop reason: HOSPADM

## 2019-05-29 RX ORDER — MORPHINE SULFATE 10 MG/ML
6 INJECTION, SOLUTION INTRAMUSCULAR; INTRAVENOUS EVERY 10 MIN PRN
Status: DISCONTINUED | OUTPATIENT
Start: 2019-05-29 | End: 2019-05-29 | Stop reason: HOSPADM

## 2019-05-29 RX ORDER — ONDANSETRON 2 MG/ML
4 INJECTION INTRAMUSCULAR; INTRAVENOUS ONCE AS NEEDED
Status: DISCONTINUED | OUTPATIENT
Start: 2019-05-29 | End: 2019-05-29 | Stop reason: HOSPADM

## 2019-05-29 RX ORDER — SODIUM CHLORIDE 9 MG/ML
INJECTION, SOLUTION INTRAVENOUS ONCE
Status: COMPLETED | OUTPATIENT
Start: 2019-05-29 | End: 2019-05-29

## 2019-05-29 RX ORDER — HALOPERIDOL 5 MG/ML
0.25 INJECTION INTRAMUSCULAR ONCE AS NEEDED
Status: DISCONTINUED | OUTPATIENT
Start: 2019-05-29 | End: 2019-05-29 | Stop reason: HOSPADM

## 2019-05-29 RX ORDER — DEXAMETHASONE SODIUM PHOSPHATE 4 MG/ML
VIAL (ML) INJECTION AS NEEDED
Status: DISCONTINUED | OUTPATIENT
Start: 2019-05-29 | End: 2019-05-29 | Stop reason: SURG

## 2019-05-29 RX ADMIN — SODIUM CHLORIDE: 9 INJECTION, SOLUTION INTRAVENOUS at 20:32:00

## 2019-05-29 RX ADMIN — DEXAMETHASONE SODIUM PHOSPHATE 4 MG: 4 MG/ML VIAL (ML) INJECTION at 20:39:00

## 2019-05-29 RX ADMIN — EPHEDRINE SULFATE 5 MG: 50 INJECTION, SOLUTION INTRAVENOUS at 21:14:00

## 2019-05-29 RX ADMIN — EPHEDRINE SULFATE 10 MG: 50 INJECTION, SOLUTION INTRAVENOUS at 20:51:00

## 2019-05-29 RX ADMIN — ONDANSETRON 4 MG: 2 INJECTION INTRAMUSCULAR; INTRAVENOUS at 20:39:00

## 2019-05-29 NOTE — PROGRESS NOTES
[facility]    Progress Note    Francheska Taylor Patient Status:  Inpatient    1927 MRN [id number]   Location [facility] 3W/SW Attending Drake Lopes MD   Hosp Day # 3 PCP Olga Lidia Blancas MD        Subjective:   Angelica Mcmillan 05/27/2019    B12 653 05/26/2019       Ct Abdomen+pelvis(contrast Only)(cpt=74177)    Result Date: 5/28/2019  CONCLUSION:   Hyperdense enlargement of the left upper pole calyx is indeterminate.   Findings may represent hemorrhage within the left upper pole

## 2019-05-29 NOTE — PLAN OF CARE
Problem: Patient Centered Care  Goal: Patient preferences are identified and integrated in the patient's plan of care  Description  Interventions:  - What would you like us to know as we care for you?   - Provide timely, complete, and accurate informatio Problem: GASTROINTESTINAL - ADULT  Goal: Minimal or absence of nausea and vomiting  Description  INTERVENTIONS:  - Maintain adequate hydration with IV or PO as ordered and tolerated  - Nasogastric tube to low intermittent suction as ordered  - Evaluate e Administer supportive blood products/factors, fluids and medications as ordered and appropriate  - Administer supportive blood products/factors as ordered and appropriate  Outcome: Progressing  Goal: Free from bleeding injury  Description  (Example usage: schedule  Outcome: Progressing  Pts hgb dropped to 6.6, 1 unit of prbc given. Urology to see patient, will keep NPO for possible cysto later today. Vitals stable. Encouraged pt to increase activity as tolerated. Will continue to monitor pt closely.

## 2019-05-29 NOTE — CM/SW NOTE
105pm: [facility] orders and F2F have been entered & sent to [facility].     -------------    1146am: SW self-referred to meet w/ pt due to case finding and diagnosis. SW met w/ pt to discuss eventual discharge needs.      Pt lives at [address] [place] [address]

## 2019-05-29 NOTE — PLAN OF CARE
Problem: Patient Centered Care  Goal: Patient preferences are identified and integrated in the patient's plan of care  Description  Interventions:  - What would you like us to know as we care for you?   - Provide timely, complete, and accurate informatio patient reports new pain  - Anticipate increased pain with activity and pre-medicate as appropriate  Outcome: Progressing     Problem: SAFETY ADULT - FALL  Goal: Free from fall injury  Description  INTERVENTIONS:  - Assess pt frequently for physical needs

## 2019-05-29 NOTE — PROGRESS NOTES
[facility]    Progress Note    Nash Greer Patient Status:  Inpatient    1927 MRN [id number]   Location [facility] 3W/SW Attending Nigel Lopez MD   Hosp Day # 3 PCP Chas Bradshaw MD        Subjective:     Danilo Art Only)(cpt=74177)    Result Date: 5/28/2019  CONCLUSION:   Hyperdense enlargement of the left upper pole calyx is indeterminate. Findings may represent hemorrhage within the left upper pole calyx versus complex mass abutting into the calyx.   Infectious jermain [phone]  5/29/2019

## 2019-05-29 NOTE — PROGRESS NOTES
[facility]    Progress Note    Lucio Muta Patient Status:  Inpatient    1927 MRN [id number]   Location [facility] 3W/SW Attending Alisa Lou MD   Hosp Day # 3 PCP Barbra Gallegos MD        Subjective:   Belen Kearney 2.830 05/26/2019    TROP <0.045 05/27/2019    B12 653 05/26/2019       Ct Abdomen+pelvis(contrast Only)(cpt=74177)    Result Date: 5/28/2019  CONCLUSION:   Hyperdense enlargement of the left upper pole calyx is indeterminate.   Findings may represent hemorr

## 2019-05-29 NOTE — PHYSICAL THERAPY NOTE
PHYSICAL THERAPY TREATMENT NOTE - INPATIENT     Room Number: 330/330-A       Presenting Problem: severe anemia (HGB 4.9 on admission); generalized weakness, SOB, chest pain    Problem List  Principal Problem:    Severe anemia  Active Problems:    Weakness the side of the bed?: A Little   How much help from another person does the patient currently need. ..   -   Moving to and from a bed to a chair (including a wheelchair)?: A Little   -   Need to walk in hospital room?: A Little   -   Climbing 3-5 steps with

## 2019-05-29 NOTE — PROGRESS NOTES
Hematology/Oncology follow up note      Still with hematuria  hgb dropped to 6.5  Stool occult was negative      Past Medical History:   Diagnosis Date   • Calculus of kidney    • Esophageal reflux    • Hearing impairment     Right side hearing aid   • Hig 8. 6   ALB  --  3.0*    138   K 4.0 3.9    104   CO2 26.0 26.0   ALKPHO  --  166*   AST  --  25   ALT  --  14*   BILT  --  0.6   TP  --  5.5*     Hyperdense enlargement of the left upper pole calyx is indeterminate.   Findings may represent hemor

## 2019-05-29 NOTE — CONSULTS
[facility]    Report of Consultation    Ham Miller Patient Status:  Inpatient    1927 MRN [id number]   Location [facility] 3W/SW Attending Raquel Park MD   Hosp Day # 3 PCP Juan Vicente MD     [facility] for [facility] ([facility]) does not think he ever had a CT urogram or retrograde pyelograms. Never smoker. He is a  but denies any sort of chemical exposure. No history of hazardous jobs in the past. He states overall he is fairly healthy and active and \"enjoys life. \" ELIOT d constipation, diarrhea, nausea and vomiting  Genitourinary:positive for hematuria, negative for decreased stream, dysuria, frequency, hesitancy, nocturia and urinary incontinence  Integument/breast: negative  Hematologic/lymphatic: negative for easy bruisi Unremarkable  PANCREAS:      The pancreas enhances symmetrically. No ductal dilation. There is a 1.7 x 1.7 cm hypodense lesion seen within the pancreatic head.   ADRENALS:      Unremarkable  KIDNEYS:          There is a 4 mm nonobstructing right lower pole body height. There is S-shaped scoliosis of the thoracolumbar spine. LUNG BASES:  Small bilateral pleural effusions with bilateral lower lobe atelectasis.  There are coronary artery calcifications.              =====  CONCLUSION:      Hyperdense enlargemen hydroureteronephrosis with a 4.5 cm complex collection within the superior pole calyx.   Findings may represent complex hydronephrosis versus complex parapelvic cyst.  Underlying cystic mass is also within the differential.  Dedicated CT   abdomen pelvis wi not know baseline renal function    ANEMIA  Transfuse per IM/heme    NEPHROLITHIASIS  No obstructing stones currently  Can address outpatient  Not cause of bleeding    Discussed case in detail with family, RN, Dr. Herbert Arevalo.     DOLORES Farley

## 2019-05-30 RX ORDER — FUROSEMIDE 20 MG/1
20 TABLET ORAL DAILY
Status: DISCONTINUED | OUTPATIENT
Start: 2019-05-30 | End: 2019-01-01

## 2019-05-30 NOTE — PLAN OF CARE
Problem: Patient Centered Care  Goal: Patient preferences are identified and integrated in the patient's plan of care  Description  Interventions:  - What would you like us to know as we care for you?   - Provide timely, complete, and accurate informatio Problem: GASTROINTESTINAL - ADULT  Goal: Minimal or absence of nausea and vomiting  Description  INTERVENTIONS:  - Maintain adequate hydration with IV or PO as ordered and tolerated  - Nasogastric tube to low intermittent suction as ordered  - Evaluate e Administer supportive blood products/factors, fluids and medications as ordered and appropriate  - Administer supportive blood products/factors as ordered and appropriate  Outcome: Progressing  Goal: Free from bleeding injury  Description  (Example usage: schedule  Outcome: Progressing     Problem: DISCHARGE PLANNING  Goal: Discharge to home or other facility with appropriate resources  Description  INTERVENTIONS:  - Identify barriers to discharge w/pt and caregiver  - Include patient/family/discharge partn

## 2019-05-30 NOTE — PROGRESS NOTES
[facility]    Progress Note    Blaise Waggoner Patient Status:  Inpatient    1927 MRN [id number]   Location [facility] 3W/SW Attending Stefania Magaña MD   Hosp Day # 4 PCP Fabiola Stevenson MD     Subjective:  Blaise Waggoner left kidney  Discussed operative findings in detail with patient  Plan repeat ureteroscopy in 1-2 weeks outpatient      GABE/POSSIBLE CKD?   Cr 1.37 s/p stent placement, down from 1.74 two days ago   Unknown baseline renal function      ANEMIA  Hgb 7.9  Sanjay Morrissey

## 2019-05-30 NOTE — PHYSICAL THERAPY NOTE
PHYSICAL THERAPY TREATMENT NOTE - INPATIENT     Room Number: 330/330-A       Presenting Problem: severe anemia (HGB 4.9 on admission); generalized weakness, SOB, chest pain    Problem List  Principal Problem:    Severe anemia  Active Problems:    Weakness Little   -   Moving from lying on back to sitting on the side of the bed?: A Little   How much help from another person does the patient currently need. ..   -   Moving to and from a bed to a chair (including a wheelchair)?: A Little   -   Need to walk in h

## 2019-05-30 NOTE — PROGRESS NOTES
Patient seen in follow up.  No new c/o.   05/29/19  1500   BP: 101/72   Pulse:    Resp:    Temp:        Intake/Output Summary (Last 24 hours) at 5/29/2019 1933  Last data filed at 5/29/2019 1700  Gross per 24 hour   Intake 605 ml   Output 1030 ml   Ne CONCLUSION:   Hyperdense enlargement of the left upper pole calyx is indeterminate. Findings may represent hemorrhage within the left upper pole calyx versus complex mass abutting into the calyx.   Infectious etiology with complex urine within the left col

## 2019-05-30 NOTE — OPERATIVE REPORT
[facility]    Patients Name: Nusrat Richmond  Attending Physician: Andrzej Camp MD  CSN: [id number]    Location:  [address] [place] [address] OR [facility] OR  MRN: [id number]    YOB: 1927  Admission Date: 5/26/2019     Operative Note    Patient Nam tumors. A 5Fr open ended ureteral catheter was placed in the patients left ureteral orifice over a wire. Initially it appeared that there were distal ureteral filling defects, but these turned out to be be blood and clots.    A retrograde pyelogram was obt

## 2019-05-30 NOTE — CARDIAC REHAB
Cardiac Rehab Phase I    Activity:  Distance   Assistance needed   Patient tolerated activity . Education:  Handouts provided and reviewed: [address] Diet: Healthy Cardiac diet reviewed.     Disease Process: Disease process rev

## 2019-05-30 NOTE — ANESTHESIA PROCEDURE NOTES
Airway  Date/Time: 5/29/2019 8:40 PM  Urgency: elective    Airway not difficult    General Information and Staff    Patient location during procedure: OR  Anesthesiologist: Ron Brooks MD  Performed: anesthesiologist     Indications and Patient Co

## 2019-05-30 NOTE — PROGRESS NOTES
[facility]    Progress Note    Christina Mohr Patient Status:  Inpatient    1927 MRN [id number]   Location [facility] 3W/SW Attending Yovani Riley MD   Hosp Day # 4 PCP Elliot Ying MD        Subjective:     rAnie Alexis B12 653 05/26/2019       Xr Or - N/c    Result Date: 5/29/2019  CONCLUSION:  FLUOROSCOPY TIME: 1.4 min # OF FLUOROGRAPHIC IMAGES: 3  Fluoroscopy and imaging was provided in the operating room. Dictated by (CST):  Ellyn Fraga MD on 5/29/2019 at 21:5

## 2019-05-30 NOTE — ANESTHESIA POSTPROCEDURE EVALUATION
Patient: Kate Torres    Procedure Summary     Date:  05/29/19 Room / Location:  [address] [place] [address] MAIN OR 14 / [address] [place] [address] MAIN OR    Anesthesia Start:  2032 Anesthesia Stop:      Procedure:  CYSTOSCOPY RETROGRADE (Left ) Diagnosis:       Ureteral stone      (Ureteral ston

## 2019-05-30 NOTE — PROGRESS NOTES
Patient seen in follow up.  No new c/o.   05/30/19  1000   BP: 110/62   Pulse:    Resp:    Temp:        Intake/Output Summary (Last 24 hours) at 5/30/2019 1256  Last data filed at 5/30/2019 1205  Gross per 24 hour   Intake 680 ml   Output 1700 ml   Ne IMPRESSION:    1. Asymptomatic bradycardia with what appears to be a short episode of sick sinus syndrome. 5 sec pause during sleep. Brief episode of bradycardia yesterday asymptomatic. 2.       Symptomatic anemia, possibly gastrointestinal bleed.

## 2019-05-30 NOTE — PROGRESS NOTES
Hematology/Oncology follow up note      S/p cysto yesterday with Dr. Chris Caraballo     Past Medical History:   Diagnosis Date   • Calculus of kidney    • Esophageal reflux    • Hearing impairment     Right side hearing aid   • High cholesterol    • Hyperlipidemia 05/30/19  0549   * 112* 125*   BUN 25* 25* 25*   CREATSERUM 1.67* 1.74* 1.37*   GFRAA 41* 39* 52*   GFRNAA 35* 34* 45*   CA 9.7 8.6 8.1*   ALB  --  3.0*  --     138 139   K 4.0 3.9 5.0    104 104   CO2 26.0 26.0 26.0   ALKPHO  --  166* hydronephrosis  - plan for repeat ureteroscopy in 1-2 weeks     nephrolithiasis  - no obstructing stones     Pancreatic lesion  - 1.7 cm lesion, will monitor for now    Dispo: okay to discharge pending stable hgb       Kathe Mehta is comfortable with

## 2019-05-30 NOTE — CONSULTS
REFERRING PHYSICIAN: Dr. Clark ref. provider found    HPI:         Thank you very much for requesting me to see the patient.        As you know, Sujata Borjas is a 80year old male who presented to ER 5/26/19 (today is 5/30/19) with c/o weakness/[place] x 4 d P urinary tract infection. Layering hyperdensity within the bladder may represent complex urine, hemorrhage or tumor. Large hiatal hernia.  1.7 cm cystic lesion seen within the pancreatic head may represent a small cystic neoplasm, dilated side-branch radicl \"severe heartburn. \" pt reports no dysphagia. 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 UGI pathology contributing to anemia cannot be excluded.  DDX: PUD, \"Mohit ulcers\" related to large hiatal hernia, vs pathology related

## 2019-05-30 NOTE — ANESTHESIA PREPROCEDURE EVALUATION
Anesthesia PreOp Note    HPI:     Charlotte Haji is a 80year old male who presents for preoperative consultation requested by: Sharita Jasso MD    Date of Surgery: 5/26/2019 - 5/29/2019    Procedure(s):  CYSTOSCOPY RETROGRADE  CYSTOSCOPY URETEROSCOPY  In mcg 25 mcg Intravenous Q5 Min PRN Kirk Merida MD     fentaNYL citrate (SUBLIMAZE) 0.05 MG/ML injection 50 mcg 50 mcg Intravenous Q5 Min PRN Kirk Merida MD     HYDROmorphone HCl (DILAUDID) 1 MG/ML injection 0.2 mg 0.2 mg Intravenous Q5 Min 1,000 mg 1,000 mg Oral Daily Ashley Gutierrez MD 1,000 mg at 05/29/19 1018    [MAR Hold] atorvastatin (LIPITOR) tab 20 mg 20 mg Oral Daily Ashley Gutierrez MD 20 mg at 05/29/19 1018    [MAR Hold] Pantoprazole Sodium (PROTONIX) EC tab 40 mg 40 mg Oral [facility] Forced sexual activity: Not on file    Other Topics      Concerns:        Not on file    Social History Narrative      Not on file      Available pre-op labs reviewed.   Lab Results   Component Value Date    WBC 13.7 (H) 05/29/2019    RBC 2.79 (L) 05/29/20 informed Jorge Guess of the nature of the anesthetic plan, benefits, risks including possible dental damage if relevant, major complications, and any alternative forms of anesthetic management.    All of the patient's questions were answered to the bes

## 2019-05-30 NOTE — PLAN OF CARE
Pt walking in the hallway with walker with 1 assist. Non skid socks provided. Fall precaution teachings provided. Call light and belongings within reach as soon as pt back in bed.

## 2019-05-31 RX ORDER — CHLORHEXIDINE GLUCONATE 4 G/100ML
30 SOLUTION TOPICAL
Status: ACTIVE | OUTPATIENT
Start: 2019-01-01 | End: 2019-01-01

## 2019-05-31 RX ORDER — SODIUM CHLORIDE 9 MG/ML
INJECTION, SOLUTION INTRAVENOUS CONTINUOUS
Status: DISCONTINUED | OUTPATIENT
Start: 2019-05-31 | End: 2019-01-01

## 2019-05-31 RX ORDER — CLINDAMYCIN PHOSPHATE 900 MG/50ML
900 INJECTION INTRAVENOUS
Status: COMPLETED | OUTPATIENT
Start: 2019-05-31 | End: 2019-01-01

## 2019-05-31 NOTE — PROGRESS NOTES
GI  PROGRESS NOTE    SUBJECTIVE: tolerating diet.  No dysphagia; no heartburn;       OBJECTIVE:  Temp:  [97.9 °F (36.6 °C)-98.2 °F (36.8 °C)] 98.2 °F (36.8 °C)  Pulse:  [61-77] 67  Resp:  [16-18] 16  BP: ()/(50-73) 97/66  Exam  Gen: No acute distres intrathoracic stomach). Recent \"severe heartburn. 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 Gross hematuria. PLAN: 1.) cont PPI   2.) diet as tolerated.     3.) will defer EGD at this point unless severe anemia recurs and no

## 2019-05-31 NOTE — PROGRESS NOTES
Hematology/Oncology follow up note      No acute events overnight     Past Medical History:   Diagnosis Date   • Calculus of kidney    • Esophageal reflux    • Hearing impairment     Right side hearing aid   • High cholesterol    • Hyperlipidemia       Pas this interval not displayed.      Recent Labs   Lab 05/28/19  1031 05/30/19  0549 05/31/19  0544   * 125* 102*   BUN 25* 25* 27*   CREATSERUM 1.74* 1.37* 1.59*   GFRAA 39* 52* 43*   GFRNAA 34* 45* 37*   CA 8.6 8.1* 7.8*   ALB 3.0*  --   --     hydronephrosis  - plan for repeat ureteroscopy in 1-2 weeks     nephrolithiasis  - no obstructing stones     Pancreatic lesion  - 1.7 cm lesion, will monitor for now    Dispo: okay to discharge from hematology perspective as hgb holding  - SW working on [employer]

## 2019-05-31 NOTE — PLAN OF CARE
Problem: Patient Centered Care  Goal: Patient preferences are identified and integrated in the patient's plan of care  Description  Interventions:  - What would you like us to know as we care for you?   - Provide timely, complete, and accurate informatio Problem: GASTROINTESTINAL - ADULT  Goal: Minimal or absence of nausea and vomiting  Description  INTERVENTIONS:  - Maintain adequate hydration with IV or PO as ordered and tolerated  - Nasogastric tube to low intermittent suction as ordered  - Evaluate e Administer supportive blood products/factors, fluids and medications as ordered and appropriate  - Administer supportive blood products/factors as ordered and appropriate  Outcome: Progressing     Problem: PAIN - ADULT  Goal: Verbalizes/displays adequate c care, etc)  - Arrange for interpreters to assist at discharge as needed  - Consider post-discharge preferences of patient/family/discharge partner  - Complete POLST form as appropriate  - Assess patient's ability to be responsible for managing their own he

## 2019-05-31 NOTE — PROGRESS NOTES
[facility]    Progress Note    Antonio Hannah Patient Status:  Inpatient    1927 MRN [id number]   Location [facility] 3W/SW Attending Tyler Camacho MD   Hosp Day # 5 PCP Drake Hansen MD     Subjective:  Antonio Hannah DOLORES  [address] [facility]  w: [phone]  c: [phone]

## 2019-05-31 NOTE — PHYSICAL THERAPY NOTE
PHYSICAL THERAPY TREATMENT NOTE - INPATIENT     Room Number: 330/330-A       Presenting Problem: severe anemia (4.9 on admit), weakness, SOB, CP(s/p urethral stent placement)    Problem List  Principal Problem:    Severe anemia  Active Problems:    Weaknes ASSESSMENT   Rating: (Denied any pain)          BALANCE                                                                                                                     Static Sitting: Good  Dynamic Sitting: Fair +           Static Standing: Fair  Dynam Current Status NT this session   Goal #2 Patient is able to demonstrate transfers Sit to/from Stand at assistance level: modified independent with cane - straight     Goal #2  Current Status Supervision   Goal #3 Patient is able to ambulate 300 feet with

## 2019-05-31 NOTE — CONSULTS
[facility] ID CONSULT NOTE    Sujata Cuff Patient Status:  Inpatient    1927 MRN [id number]   Location [facility] 3W/SW Attending Jesus Campbell MD   Hosp Day # 5 PCP Andrew Raya MD       Reason for C SURGERY       History reviewed. No pertinent family history. reports that he has never smoked. He has never used smokeless tobacco. He reports that he does not drink alcohol or use drugs.     Allergies:    Penicillin G                Comment:Dona Allerg Supple. Cardiovascular: RRR  Respiratory: CTAB. Abdomen: Soft, NTND  Musculoskeletal: No edema noted, no CVA tenderness  Integument: No lesions. No erythema.   Lines: PIV+    Laboratory Data:  Recent Labs   Lab 05/29/19  0655  05/31/19  0544   RBC 2.79* by urology, s/p cysto with ureteroscopy with L ureteral stent placement with intraoperative findings with clot in bladder, no tumors, given cipro preoperatively. Has remained afebrile throughout hospital stay.  Wbc up from 13.9 to 14.5 this AM, UA sent with

## 2019-05-31 NOTE — PROGRESS NOTES
[facility]    Progress Note    Jero Beltran Patient Status:  Inpatient    1927 MRN [id number]   Location [facility] 3W/SW Attending Ashley Gutierrez MD   Hosp Day # 5 PCP Ana Pope MD        Subjective:     Nikko Shallow Xr Or - N/c    Result Date: 5/29/2019  CONCLUSION:  FLUOROSCOPY TIME: 1.4 min # OF FLUOROGRAPHIC IMAGES: 3  Fluoroscopy and imaging was provided in the operating room. Dictated by (CST):  Mary Edmonds MD on 5/29/2019 at 21:54     Approved by (

## 2019-05-31 NOTE — PROGRESS NOTES
Patient seen in follow up.  No new c/o.   05/31/19  1050   BP:    Pulse: 77   Resp:    Temp:        Intake/Output Summary (Last 24 hours) at 5/31/2019 1529  Last data filed at 5/31/2019 1341  Gross per 24 hour   Intake 370 ml   Output 1750 ml   Net -1 1.       Asymptomatic bradycardia with what appears to be a short episode of sick sinus syndrome. 5 sec pause during sleep. Brief episode of bradycardia yesterday asymptomatic. 2.       Symptomatic anemia, possibly gastrointestinal bleed.   There is iron d

## 2019-06-01 NOTE — PROGRESS NOTES
[facility] ID PROGRESS NOTE    Albert Gaines Patient Status:  Inpatient    1927 MRN [id number]   Location [facility] 3W/SW Attending Preeti Morris MD   Hosp Day # 6 PCP Jayce Maldonado MD     Subjective:  No fever. HDS. Awake and alert.  Resti wbcs, with 2,000 RBCs, urine cx not sent, s/p three units pRBC, followed by hematology. Also with sinus bradycardia, followed by cardiology.  Developed hematuria, US kidneys with moderate L hydrourteronephrosis with 4.5 cm complex collection, s/p CT A/P wit

## 2019-06-01 NOTE — PROGRESS NOTES
[facility]    Progress Note    Dagoberto Velásquez Patient Status:  Inpatient    1927 MRN [id number]   Location [facility] 3W/SW Attending Kristopher Infante MD   Hosp Day # 6 PCP Michelle Lizarraga MD        Subjective:    patient 05/26/2019    TSH 2.830 05/26/2019    TROP <0.045 05/27/2019    B12 653 05/26/2019                   Assessment and Plan:     Severe Anemia most likely secondary from hematuria-hematology and urology consulted. Status post transfusion and IV Venofer.   Sto

## 2019-06-01 NOTE — PROGRESS NOTES
Patient seen in follow up.  No new c/o.   06/01/19  0900   BP: 96/59   Pulse: 68   Resp: 17   Temp: 98.1 °F (36.7 °C)       Intake/Output Summary (Last 24 hours) at 6/1/2019 1612  Last data filed at 6/1/2019 1100  Gross per 24 hour   Intake 438 ml   O 5/31/19 patient had a pause of 4.12 S at 9 AM while awake , we will plan for possible PPM Monday, risk and benefit will be discussed tomorrow    6/1/19 patient still having pauses, asymptomatic, risk and benefit not limited to death, heart damage and infec

## 2019-06-01 NOTE — PLAN OF CARE
Pt is alert and oriented. Pt aware of pace maker placement. Pt would like to have the cardiologist to explain more about the procedure. Consent form in patient's chart. Pt's urine color is still dark red to purple red.  No complaints of burning or discomfor Obtain nutritional consult as needed  - Evaluate fluid balance  Outcome: Progressing  Goal: Maintains adequate nutritional intake (undernourished)  Description  INTERVENTIONS:  - Monitor percentage of each meal consumed  - Identify factors contributing to that affect risk of falls.   - [place] fall precautions as indicated by assessment.  - Educate pt/family on patient safety including physical limitations  - Instruct pt to call for assistance with activity based on assessment  - Modify environment to redu

## 2019-06-02 NOTE — PROGRESS NOTES
Patient seen in follow up.  No new c/o.   06/02/19  1215   BP:    Pulse: 67   Resp:    Temp:        Intake/Output Summary (Last 24 hours) at 6/2/2019 1605  Last data filed at 6/2/2019 1400  Gross per 24 hour   Intake 105 ml   Output 1825 ml   Net -172 GABE improving. Hgb stable. Plan for repeat ureteroscopy in a couple of weeks.       5/31/19 patient had a pause of 4.12 S at 9 AM while awake , we will plan for possible PPM Monday, risk and benefit will be discussed tomorrow    6/1/19 patient still having

## 2019-06-02 NOTE — PLAN OF CARE
Problem: Patient Centered Care  Goal: Patient preferences are identified and integrated in the patient's plan of care  Description  Interventions:  - What would you like us to know as we care for you?   - Provide timely, complete, and accurate informatio Problem: GASTROINTESTINAL - ADULT  Goal: Minimal or absence of nausea and vomiting  Description  INTERVENTIONS:  - Maintain adequate hydration with IV or PO as ordered and tolerated  - Nasogastric tube to low intermittent suction as ordered  - Evaluate e pressure on venipuncture sites to achieve adequate hemostasis  - Assess for signs and symptoms of internal bleeding  - Monitor lab trends  Outcome: Progressing     Problem: PAIN - ADULT  Goal: Verbalizes/displays adequate comfort level or patient's stated interpreters to assist at discharge as needed  - Consider post-discharge preferences of patient/family/discharge partner  - Complete POLST form as appropriate  - Assess patient's ability to be responsible for managing their own health  - Refer to [facility] FOR REHAB MEDICINE

## 2019-06-03 NOTE — PROGRESS NOTES
[facility]    Progress Note    Blaise Waggoner Patient Status:  Inpatient    1927 MRN [id number]   Location [facility] 3W/SW Attending Stefania Magaña MD   Hosp Day # 8 PCP Fabiola Stevenson MD        Subjective:     Itzel Amin 653 05/26/2019       Mri Abdomen (w+wo) (cpt=74183)    Result Date: 6/3/2019  CONCLUSION:  Motion-degraded examination. Within these parameters: 1.  There is a complex lesion at the upper pole of the left kidney, likely with intrinsically hemorrhagic conten Consulted today: continue ppi, if Hgb drops would consider EGD    *Hematuria/Left hydronephrosis/7mm non-obstructing calculus  - s/p cystoscopy with L ureteral stent, clot evacuation yesterday. No tumors found. Plan for repeat ureteroscopy 1-2 weeks.   -co

## 2019-06-03 NOTE — PROGRESS NOTES
Came to see patient but off floor for pacemaker. MRI yesterday revealed complex lesion left kidney with ongoing hemorrhage and left hydroureteronephrosis. Dr. Arcenio Chand will plan reexamination via ureteroscopy later this week, possible Wednesday (6/5/19).  O

## 2019-06-03 NOTE — PROGRESS NOTES
[facility] ID PROGRESS NOTE    Serratongozi Navarro Patient Status:  Inpatient    1927 MRN [id number]   Location [facility] 3W/SW Attending Tristin Gomez MD   Hosp Day # 8 PCP Latisha Noble MD     Subjective:  Awake, s/p PPM today. Afebrile.  Still followed by hematology. Also with sinus bradycardia, followed by cardiology.  Developed hematuria, US kidneys with moderate L hydrourteronephrosis with 4.5 cm complex collection, s/p CT A/P with hyperdense enlargement of L upper pole calyx representing hemo

## 2019-06-03 NOTE — PROGRESS NOTES
Procedure hand off report given to Gricel Davila RN. Procedure site remains dry and intact with no signs and symptoms of bleeding and hematoma. Bedrest maintained. Dr Kris Caldwell spoke with pt post procedure.

## 2019-06-03 NOTE — PHYSICAL THERAPY NOTE
Chart reviewed, discussed case with RN. Pt s/p pacemaker placement this AM, currently on bedrest. To HOLD PT treatment today and f/u tomorrow. Will re-attempt tomorrow as appropriate, schedule permitting.

## 2019-06-03 NOTE — OPERATIVE REPORT
Preop diagnosis: sick sinus syndrome  Post op diagnosis: single chamber ppm  Procedures: As above  Findings: [place] ppm  EBL: 20 mls  Specimens: None

## 2019-06-03 NOTE — PLAN OF CARE
Problem: Patient Centered Care  Goal: Patient preferences are identified and integrated in the patient's plan of care  Description  Interventions:  - What would you like us to know as we care for you? I live across the street at [facility].  Living wi for signs/symptoms of CO2 retention  Outcome: Progressing     Problem: GASTROINTESTINAL - ADULT  Goal: Minimal or absence of nausea and vomiting  Description  INTERVENTIONS:  - Maintain adequate hydration with IV or PO as ordered and tolerated  - Nasogastr hemorrhage  - Monitor labs and vital signs for trends  - Administer supportive blood products/factors, fluids and medications as ordered and appropriate  - Administer supportive blood products/factors as ordered and appropriate  Outcome: Progressing  Goal: strengthening/mobility  - Encourage toileting schedule  Outcome: Progressing     Problem: DISCHARGE PLANNING  Goal: Discharge to home or other facility with appropriate resources  Description  INTERVENTIONS:  - Identify barriers to discharge w/pt and careg

## 2019-06-03 NOTE — PROGRESS NOTES
Inpatient Throughput Communication:    Called inpatient RN Jimbo Tejada and notified of scheduled procedure pacemaker on 6/3/19     Verified that appropriate consent is signed: Yes  Appropriate Consent Signed: Yes  Access Site Hair Clipped and skin prepped:  Yes

## 2019-06-03 NOTE — PROCEDURES
[facility]    PATIENT'S NAME: Dorothy Chen   ATTENDING PHYSICIAN: Kyle Dickinson MD   OPERATING PHYSICIAN: Jimmy Sheppard DO   PATIENT ACCOUNT#:   [id number]    LOCATION:  [address] [place] [address] #:   [id number]       DATE OF BIRTH:  12 screwed into place. The generator was then placed into the pocket, and pocket was closed using 2-0, 3-0, and 4-0 Vicryl sutures in 3 separate layers. Steri-Strips were applied.   An occlusive dressing was applied, and a pressure dressing was applied on to

## 2019-06-04 NOTE — PROGRESS NOTES
[facility]    Progress Note    Chris Mendieta Patient Status:  Inpatient    1927 MRN [id number]   Location [facility] 3W/SW Attending Sandro Vazquez MD   Hosp Day # 9 PCP Armando Burch MD        Subjective:     Nikole Canales 05/27/2019    T4F 1.1 05/26/2019    TSH 2.830 05/26/2019    TROP <0.045 05/27/2019    B12 653 05/26/2019       Mri Abdomen (w+wo) (cpt=74183)    Result Date: 6/3/2019  CONCLUSION:  Motion-degraded examination. Within these parameters: 1.  There is a complex negative, no hemolysis  -IV venofer  -s/p 4 U prbc.  hgb 4.9 --> 8.0 today/stable  -GI  Consulted today: continue ppi, if Hgb drops would consider EGD  -bone marrow ordered by heme/ d/w Dr Be Lizarraga    *Hematuria/Left hydronephrosis/7mm non-obstructing ca

## 2019-06-04 NOTE — PROGRESS NOTES
[facility] ID PROGRESS NOTE    Michaelyn Gilford Patient Status:  Inpatient    1927 MRN [id number]   Location [facility] 3W/SW Attending Macy Lewis MD   Hosp Day # 9 PCP Barrera Elena MD     Subjective:  Awake, no new complaints.  Remains wit urine cx not sent, s/p three units pRBC, followed by hematology. Also with sinus bradycardia, followed by cardiology.  Developed hematuria, US kidneys with moderate L hydrourteronephrosis with 4.5 cm complex collection, s/p CT A/P with hyperdense enlargemen

## 2019-06-04 NOTE — PHYSICAL THERAPY NOTE
PHYSICAL THERAPY TREATMENT NOTE - INPATIENT     Room Number: 330/330-A       Presenting Problem: severe anemia (4.9 on admit), weakness, SOB, CP(s/p urethral stent placement)    Problem List  Principal Problem:    Severe anemia  Active Problems:    Weaknes with home health PT(w/increased assist/support and transition to [facility])     PLAN  PT Treatment Plan: Bed mobility; Body mechanics; Don/doff brace; Endurance; Energy conservation; Family education;Patient education;Gait training;Strengthening;Stoop training;Trans assist;Supervision  Distance (ft): 400', 50'  Assistive Device: Cane  Pattern: Shuffle(slow alen, flexed posture)  Stoop/Curb Assistance: Not tested  Comment : Patient ambulates increased distance today, still mild unsteadiness with cane only when turni present for training as well   Goal #5     Goal #5   Current Status     Goal #6     Goal #6  Current Status

## 2019-06-04 NOTE — PROGRESS NOTES
Below note from [facility]. Patient was interviewed and examined. Agree with assessment and plan with edits to the document performed as necessary.     [facility]  Progress Note    Marilyn Horner Patient Status:  Inpatient    1927 MRN [id number] no murmur, rub or gallop. Site to left chest no bleeding or redness, pressure dressing removed, tegaderm dressing remains  Lungs: Clear without wheezes, rales, rhonchi or dullness. Normal excursions and effort. Abdomen: Soft, non-tender.    Extremities: Acetaminophen-Codeine #3 (TYLENOL #3) 300-30 MG tab 2 tablet 2 tablet Oral Q4H PRN   0.9%  NaCl infusion  Intravenous Continuous   furosemide (LASIX) tab 20 mg 20 mg Oral Daily   Normal Saline Flush 0.9 % injection 10 mL 10 mL Intravenous PRN   Vitamin C

## 2019-06-04 NOTE — PLAN OF CARE
Problem: Patient Centered Care  Goal: Patient preferences are identified and integrated in the patient's plan of care  Description  Interventions:  - What would you like us to know as we care for you? I live across the street at [facility].  Living wi for signs/symptoms of CO2 retention  Outcome: Progressing     Problem: GASTROINTESTINAL - ADULT  Goal: Minimal or absence of nausea and vomiting  Description  INTERVENTIONS:  - Maintain adequate hydration with IV or PO as ordered and tolerated  - Nasogastr hemorrhage  - Monitor labs and vital signs for trends  - Administer supportive blood products/factors, fluids and medications as ordered and appropriate  - Administer supportive blood products/factors as ordered and appropriate  Outcome: Progressing  Goal: strengthening/mobility  - Encourage toileting schedule  Outcome: Progressing     Problem: DISCHARGE PLANNING  Goal: Discharge to home or other facility with appropriate resources  Description  INTERVENTIONS:  - Identify barriers to discharge w/pt and careg

## 2019-06-04 NOTE — PROGRESS NOTES
[place] [facility] - [facility]    Progress Note    Nusrat Richmond Patient Status:  Inpatient    1927 MRN [id number]   Location [facility] 3W/SW Attending Andrzej Camp, [address] [facility] [address] Day # 9 PCP Ira Nicholson MD     Subjective:  Nusrat Richmond examination quality is substantially compromised by patient motion artifact. LUNG BASES:  A trace right pleural effusion is noted. There is dependent signal abnormality likely reflecting subsegmental atelectasis bilaterally.   LIVER:  No focal hepatic mass lymphadenopathy. ABDOMINAL WALL:      Unremarkable. BONES:             Suboptimally assessed by scan protocol, but with angulated levoscoliotic curvature of the lumbar spine. A mild compression deformity of the superior endplate of L2 is evident.  Mul retrograde pyelogram, ureteroscopy, possible biopsy, ureteral stent exchange tomorrow (6/5/19) at 07:30 a.m. with Dr. Rose He   - NPO at MN, pre-op Cipro (PCN allergy)   - discussed risks including infection, bleeding, ureteral/bladder/kidney injury, anesthe

## 2019-06-04 NOTE — PROGRESS NOTES
Hematology/Oncology follow up note      S/p pacemaker   Feels well no complaints  Still with hematuria but no clots     Past Medical History:   Diagnosis Date   • Calculus of kidney    • Esophageal reflux    • Hearing impairment     Right side hearing aid 06/02/19  0521   * 102* 90   BUN 25* 27* 27*   CREATSERUM 1.37* 1.59* 1.36*   GFRAA 52* 43* 52*   GFRNAA 45* 37* 45*   CA 8.1* 7.8* 7.8*    140 140   K 5.0 4.7 4.1    105 105   CO2 26.0 29.0 29.0     Hyperdense enlargement of the left up

## 2019-06-05 NOTE — PROGRESS NOTES
Hematology/Oncology follow up note      hgb 8.6 today     Past Medical History:   Diagnosis Date   • Calculus of kidney    • Esophageal reflux    • Hearing impairment     Right side hearing aid   • High cholesterol    • Hyperlipidemia       Past Surgical H CREATSERUM 1.37* 1.59* 1.36*   GFRAA 52* 43* 52*   GFRNAA 45* 37* 45*   CA 8.1* 7.8* 7.8*    140 140   K 5.0 4.7 4.1    105 105   CO2 26.0 29.0 29.0     Hyperdense enlargement of the left upper pole calyx is indeterminate.   Findings may repre satisfaction. Thank you for allowing me to participate in the care of this patient, please call with any questions.     Juan Manuel Godwin M.D.  [facility] Hematology and Oncology

## 2019-06-05 NOTE — H&P
From Dr. Laura Peng  The above referenced H&P was reviewed by Idania Lyn MD on 6/5/2019, the patient was examined and no significant changes have occurred in the patient's condition since the H&P was performed.   Risks and benefits were discussed, proceed with

## 2019-06-05 NOTE — PROGRESS NOTES
[facility]    Progress Note    Marilyn Horner Patient Status:  Inpatient    1927 MRN [id number]   Location [facility] 3W/SW Attending Marta Ledezma MD   Hosp Day # 10 PCP Anyi Chung MD        Subjective:     Asim Cid 2.830 05/26/2019    TROP <0.045 05/27/2019    B12 653 05/26/2019       Xr Chest Ap Portable  (cpt=71045)    Result Date: 6/3/2019  CONCLUSION:  1. No pneumothorax status post placement left-sided pacemaker.     Dictated by (CST): Juan Benitez MD

## 2019-06-05 NOTE — PLAN OF CARE
Problem: Patient Centered Care  Goal: Patient preferences are identified and integrated in the patient's plan of care  Description  Interventions:  - What would you like us to know as we care for you? I live across the street at [facility].  Living wi for signs/symptoms of CO2 retention  Outcome: Progressing     Problem: GASTROINTESTINAL - ADULT  Goal: Minimal or absence of nausea and vomiting  Description  INTERVENTIONS:  - Maintain adequate hydration with IV or PO as ordered and tolerated  - Nasogastr hemorrhage  - Monitor labs and vital signs for trends  - Administer supportive blood products/factors, fluids and medications as ordered and appropriate  - Administer supportive blood products/factors as ordered and appropriate  Outcome: Progressing  Goal: strengthening/mobility  - Encourage toileting schedule  Outcome: Progressing     Problem: DISCHARGE PLANNING  Goal: Discharge to home or other facility with appropriate resources  Description  INTERVENTIONS:  - Identify barriers to discharge w/pt and careg

## 2019-06-05 NOTE — BRIEF PROCEDURE NOTE
[facility]  Procedure Note    Nusrat Basilio Patient Status:  Inpatient    1927 MRN [id number]   Location [address] UNIT Attending Andrzej Camp MD   Hosp Day # 10 PCP Ira Nicholson MD     Proc

## 2019-06-05 NOTE — ANESTHESIA PROCEDURE NOTES
Airway  Date/Time: 6/5/2019 7:51 AM  Urgency: elective    Airway not difficult    General Information and Staff    Patient location during procedure: OR  Anesthesiologist: Londa Skiff, MD  Resident/CRNA: Flor Britt CRNA  Performed: [facility]     Ind

## 2019-06-05 NOTE — ADDENDUM NOTE
Addendum  created 06/05/19 0922 by Sandy Castrejon CRNA    Intraprocedure Blocks edited, Sign clinical note

## 2019-06-05 NOTE — ANESTHESIA POSTPROCEDURE EVALUATION
Patient: Cheryl Pérez    Procedure Summary     Date:  06/05/19 Room / Location:  [facility] OR  / [facility] OR    Anesthesia Start:  2288 Anesthesia Stop:  0900    Procedure:  CYSTOSCOPY URETEROSCOPY (Left ) Diagnosis:       Hydronephrosis, unspecified

## 2019-06-05 NOTE — PROGRESS NOTES
Gave report to [employer] in [address]. Pt started on Lactated ringers @20mL/hr per OR nurse. Contacted [employer] to verify if pacemaker is MRI compatible per OR nurse request. Per [employer] representative, the pacemaker device is compatible. However, they recommend s

## 2019-06-05 NOTE — PLAN OF CARE
Problem: Patient Centered Care  Goal: Patient preferences are identified and integrated in the patient's plan of care  Description  Interventions:  - What would you like us to know as we care for you? I live across the street at [facility].  Living wi adequate hydration with IV or PO as ordered and tolerated  - Nasogastric tube to low intermittent suction as ordered  - Evaluate effectiveness of ordered antiemetic medications  - Provide nonpharmacologic comfort measures as appropriate  - Advance diet as products/factors as ordered and appropriate  Outcome: Progressing  Goal: Free from bleeding injury  Description  (Example usage: patient with low platelets)  INTERVENTIONS:  - Avoid intramuscular injections, enemas and rectal medication administration  - E resources  Description  INTERVENTIONS:  - Identify barriers to discharge w/pt and caregiver  - Include patient/family/discharge partner in discharge planning  - Arrange for needed discharge resources and transportation as appropriate  - Identify discharge

## 2019-06-05 NOTE — OPERATIVE REPORT
[facility]    Patients Name: Meenakshi Charles  Attending Physician: Lukasz Anguiano MD  CSN: [id number]    Location:  [address] [place] [address] OR [facility] OR*  MRN: [id number]    YOB: 1927  Admission Date: 5/26/2019     Operative Note    Patient Nam was obtained, findings are described above. There was a renal pelvic filling defect from the upper pole. We then obtained renal pelvic washings through the access sheath.   We then passed the ureteroscope into the kidney and identified large organized clot

## 2019-06-05 NOTE — PROGRESS NOTES
0945 PT FROM PACU TO CT SUPINE ON TABLE. MONITORS APPLIED AND CONSENT VERIFIED. PROCEDURE COMPLETE AT 0953. BONE MARROW BIOPSY COMPLETE. 2X2 AND TEGADERM DRESSING APPLIED.    REPORT CALLED TO RN

## 2019-06-05 NOTE — ANESTHESIA PREPROCEDURE EVALUATION
Anesthesia PreOp Note    HPI:     Christina Mohr is a 80year old male who presents for preoperative consultation requested by: Shawn Larkin MD    Date of Surgery: 5/26/2019 - 6/5/2019    Procedure(s):  CYSTOSCOPY URETEROSCOPY  Indication: Hydronephrosis PRN Kip Ly, CRNA 10 mg at 06/05/19 8855   Ciprofloxacin in D5W (CIPRO) IVPB premix SOLN 400 mg 400 mg Intravenous 60 Min Pre-Op Cindy Garay PA-C 400 mg at 06/05/19 0751   [MAR Hold] ondansetron HCl (ZOFRAN) injection 4 mg 4 mg Intravenous Transportation needs:        Medical: Not on file        Non-medical: Not on file    Tobacco Use      Smoking status: Never Smoker      Smokeless tobacco: Never Used    Substance and Sexual Activity      Alcohol use: Never        Frequency: Never      Drug 06/05/19  0548 06/05/19  0652   BP: 109/69 115/74 100/59 105/55   Pulse: 68 71 69 69   Resp: 16 18 16 16   Temp: 98.1 °F (36.7 °C) 98.5 °F (36.9 °C) 98 °F (36.7 °C) 98 °F (36.7 °C)   TempSrc: Oral Oral Oral Oral   SpO2: 95% 96% 95% 93%   Weight:   77.8 kg

## 2019-06-06 NOTE — PHYSICAL THERAPY NOTE
PHYSICAL THERAPY TREATMENT NOTE - INPATIENT     Room Number: 330/330-A       Presenting Problem: severe anemia (4.9 on admit), weakness, SOB, CP(s/p urethral stent placement)    Problem List  Principal Problem:    Severe anemia  Active Problems:    Weaknes based on documentation in the [facility] '6 clicks' Inpatient Basic Mobility Short Form. Research supports that patients with this level of impairment may benefit from [address]     However, this therapist anticipate that pt would benefit from sub-acute rehab at [facility] currently need. ..   -   Moving to and from a bed to a chair (including a wheelchair)?: A Little   -   Need to walk in hospital room?: A Little   -   Climbing 3-5 steps with a railing?: A Little     AM-PAC Score:  Raw Score: 18   Approx Degree of Impairment

## 2019-06-06 NOTE — PLAN OF CARE
Problem: Patient Centered Care  Goal: Patient preferences are identified and integrated in the patient's plan of care  Description  Interventions:  - What would you like us to know as we care for you? I live across the street at [facility].  Living wi Problem: GASTROINTESTINAL - ADULT  Goal: Minimal or absence of nausea and vomiting  Description  INTERVENTIONS:  - Maintain adequate hydration with IV or PO as ordered and tolerated  - Nasogastric tube to low intermittent suction as ordered  - Evaluate e Administer supportive blood products/factors, fluids and medications as ordered and appropriate  - Administer supportive blood products/factors as ordered and appropriate  Outcome: Progressing  Goal: Free from bleeding injury  Description  (Example usage: appropriate  Outcome: Progressing     Problem: DISCHARGE PLANNING  Goal: Discharge to home or other facility with appropriate resources  Description  INTERVENTIONS:  - Identify barriers to discharge w/pt and caregiver  - Include patient/family/discharge pa

## 2019-06-06 NOTE — CM/SW NOTE
425pm:  NP/Dianna informed SW that no discharge today, will need SNF. SW spoke w/ PT/OT who are currently recommending SNF. DON screen called to [facility]. SW met w/ pt, pt's wife and dtr Shelburn Peers who are agreeable w/ [employer]. Referral sent to [facility].

## 2019-06-06 NOTE — PROGRESS NOTES
[facility]    Progress Note    [facility] Patient Status:  Inpatient    1927 MRN [id number]   Location [facility] 3W/SW Attending Anant Alaniz MD   Hosp Day # 11 PCP Severo Child, MD     Subjective:  Miguel Ángel Oakley call to schedule. Reviewed possible risks including infection, bleeding, injury to surrounding organs, post op ileus/bowel obstruction, anesthesia complications, and death.  Reviewed procedure in detail including recovery time and post operative restriction

## 2019-06-06 NOTE — PLAN OF CARE
Problem: Patient Centered Care  Goal: Patient preferences are identified and integrated in the patient's plan of care  Description  Interventions:  - What would you like us to know as we care for you? I live across the street at [facility].  Living wi for signs/symptoms of CO2 retention  Outcome: Progressing     Problem: GASTROINTESTINAL - ADULT  Goal: Minimal or absence of nausea and vomiting  Description  INTERVENTIONS:  - Maintain adequate hydration with IV or PO as ordered and tolerated  - Nasogastr hemorrhage  - Monitor labs and vital signs for trends  - Administer supportive blood products/factors, fluids and medications as ordered and appropriate  - Administer supportive blood products/factors as ordered and appropriate  Outcome: Progressing  Goal: strengthening/mobility  - Encourage toileting schedule  Outcome: Progressing     Problem: DISCHARGE PLANNING  Goal: Discharge to home or other facility with appropriate resources  Description  INTERVENTIONS:  - Identify barriers to discharge w/pt and careg

## 2019-06-06 NOTE — PROGRESS NOTES
[facility]    Progress Note    Calli Castillo Patient Status:  Inpatient    1927 MRN [id number]   Location [facility] 3W/SW Attending Cindy Nieto MD   Hosp Day # 11 PCP Mandy Green MD        Subjective:     Lisy Sherwood 6/6/2019  CONCLUSION: CT-guided percutaneous bone marrow biopsy    Dictated by (CST): Rancho Salinas MD on 6/06/2019 at 7:23     Approved by (CST): Rancho Salinas MD on 6/06/2019 at 7:24          Xr Or - N/c    Result Date: 6/5/2019  CONCLUSION:  1. 4.5 [phone]  6/6/2019

## 2019-06-07 NOTE — PHYSICAL THERAPY NOTE
PHYSICAL THERAPY TREATMENT NOTE - INPATIENT     Room Number: 330/330-A       Presenting Problem: severe anemia (4.9 on admit), weakness, SOB, CP(s/p urethral stent placement)    Problem List  Principal Problem:    Severe anemia  Active Problems:    Weaknes about not breaking his pacemaker precautions. He is also struggling with standing up from low surface as it is difficult for him to do without the use of both UE's.       DISCHARGE RECOMMENDATIONS  PT Discharge Recommendations: Sub-acute rehabilitation Assistance: Not tested  Comment : (/)    ERAPEUTIC EXERCISES  Lower Extremity Heel raises, leg lift, abd<>add, mini-squat     Position Standing 10x 2           Patient End of Session: In bed;Needs met;Call light within reach;RN aware of session/findings; Al

## 2019-06-07 NOTE — PLAN OF CARE
Problem: Patient Centered Care  Goal: Patient preferences are identified and integrated in the patient's plan of care  Description  Interventions:  - What would you like us to know as we care for you? I live across the street at [facility].  Living wi for signs/symptoms of CO2 retention  Outcome: Progressing     Problem: GASTROINTESTINAL - ADULT  Goal: Minimal or absence of nausea and vomiting  Description  INTERVENTIONS:  - Maintain adequate hydration with IV or PO as ordered and tolerated  - Nasogastr hemorrhage  - Monitor labs and vital signs for trends  - Administer supportive blood products/factors, fluids and medications as ordered and appropriate  - Administer supportive blood products/factors as ordered and appropriate  Outcome: Progressing  Goal: strengthening/mobility  - Encourage toileting schedule  Outcome: Progressing     Problem: DISCHARGE PLANNING  Goal: Discharge to home or other facility with appropriate resources  Description  INTERVENTIONS:  - Identify barriers to discharge w/pt and careg

## 2019-06-07 NOTE — PROGRESS NOTES
[facility] ID PROGRESS NOTE    Lyndseymahad Crawford Patient Status:  Inpatient    1927 MRN [id number]   Location [facility] 3W/SW Attending Chu Hanna MD   Hosp Day # 12 PCP Jennifer Urrutia MD     Subjective:  Awake, no new complaints.  RN reports followed by cardiology.  Developed hematuria, US kidneys with moderate L hydrourteronephrosis with 4.5 cm complex collection, s/p CT A/P with hyperdense enlargement of L upper pole calyx representing hemorrhage, with 1.7 cm cystic lesion within pancreatic h

## 2019-06-07 NOTE — PLAN OF CARE
Problem: Patient Centered Care  Goal: Patient preferences are identified and integrated in the patient's plan of care  Description  Interventions:  - What would you like us to know as we care for you? I live across the street at [facility].  Living wi for signs/symptoms of CO2 retention  Outcome: Progressing     Problem: GASTROINTESTINAL - ADULT  Goal: Minimal or absence of nausea and vomiting  Description  INTERVENTIONS:  - Maintain adequate hydration with IV or PO as ordered and tolerated  - Nasogastr hemorrhage  - Monitor labs and vital signs for trends  - Administer supportive blood products/factors, fluids and medications as ordered and appropriate  - Administer supportive blood products/factors as ordered and appropriate  Outcome: Progressing  Goal: strengthening/mobility  - Encourage toileting schedule  Outcome: Progressing     Problem: DISCHARGE PLANNING  Goal: Discharge to home or other facility with appropriate resources  Description  INTERVENTIONS:  - Identify barriers to discharge w/pt and careg

## 2019-06-07 NOTE — PROGRESS NOTES
[facility]    Progress Note    Chris Mendieta Patient Status:  Inpatient    1927 MRN [id number]   Location [facility] 3W/SW Attending Sandro Vazquez MD   Hosp Day # 12 PCP Armando Burch MD        Subjective:     Connie Varma 6/6/2019  CONCLUSION: CT-guided percutaneous bone marrow biopsy    Dictated by (CST): Quynh Smyth MD on 6/06/2019 at 7:23     Approved by (CST): Quynh Smyth MD on 6/06/2019 at 7:24          Xr Or - N/c    Result Date: 6/5/2019  CONCLUSION:  1. 4.5 consult     GI prophylaxis: ppi  DVT prophylaxis scd  Code status: Full  Disposition: came from [facility], PT/OT re-eval, plan for [facility]. Anticipate 1-2 days.        Rounded with [facility]  Dr Estephania Grewal  Office:  [phone]

## 2019-06-07 NOTE — PROGRESS NOTES
[facility] AND [facility]  Progress Note    Jero Beltran Patient Status:  Inpatient    1927 MRN [id number]   Location [facility] 3W/SW Attending Ashley Gutierrez MD   Hosp Day # 12 PCP Ana Pope MD     24 h events  VS stable          Carissa Marty status post placement left-sided pacemaker. Echo 5/28/19  Study Conclusions  1. Left ventricle: The cavity size was normal. Wall thickness was     increased in a pattern of mild LVH.  Systolic function was     normal. The estimated ejection fraction was Vitamin C tab 1,000 mg 1,000 mg Oral Daily   atorvastatin (LIPITOR) tab 20 mg 20 mg Oral Daily   Pantoprazole Sodium (PROTONIX) EC tab 40 mg 40 mg Oral QAM AC   Calcium Carbonate Antacid (TUMS) chewable tab 1,000 mg 1,000 mg Oral TID PRN       Assessment

## 2019-06-07 NOTE — OCCUPATIONAL THERAPY NOTE
OCCUPATIONAL THERAPY EVALUATION - INPATIENT     Room Number: 330/330-A  Evaluation Date: 6/6/2019  Type of Evaluation: Initial       Physician Order: IP Consult to Occupational Therapy  Reason for Therapy: ADL/IADL Dysfunction and Discharge Planning    [facility] throughout position changes. Pt completed sit>stand from EOB with great effort and poor safety provided with CGA --pt launching self forward, hyper-extending knees. Pt required verbal cues to not utilize LUE when coming to stand.  Pt ambulated 15ft with CGA Hyperlipidemia        Past Surgical History  Past Surgical History:   Procedure Laterality Date   • CYSTOSCOPY RETROGRADE Left 5/29/2019    Performed by Margarita Rivers MD at [address] [place] [address] OR   • CYSTOSCOPY URETEROSCOPY Left 6/5/2019    Performed by KENDRA Walsh NT  Shower Transfer: NT  Chair Transfer: CGA    Bedroom Mobility: Min A/ CGA    BALANCE ASSESSMENT  Static Sitting: fair  Dynamic Sitting: fair  Static Standing: fair-    FUNCTIONAL ADL ASSESSMENT  Grooming: SBA / set-up  Feeding: SBA / set-up  Bathing: NT

## 2019-06-08 NOTE — PROGRESS NOTES
[facility] CLINICS  Progress Note    José Manuel Age Patient Status:  Inpatient    1927 MRN [id number]   Location [facility] 3W/SW Attending Sena Coles MD   Hosp Day # 15 PCP Momo Nina MD     Subjective:  Patient seen in follow up effort. Abdomen: Soft, non-tender. Extremities: trace edema RLE, none LLE. Peripheral pulses are 2+ bilateral pedal and radial sites. Neurologic: Alert and oriented, normal affect. Skin: Warm and dry.      Laboratory/Data:  Diagnostics:     6/7/19  Gl Assessment and Plan:    1.  Sick sinus syndrome  -  Single chamber PPM placed 6/3/19  - The device is a [employer] model number [id number], serial number [id number].  Ventricular lead is model number [id number], serial number [id number].  The device measured the

## 2019-06-08 NOTE — PLAN OF CARE
Problem: Patient Centered Care  Goal: Patient preferences are identified and integrated in the patient's plan of care  Description  Interventions:  - What would you like us to know as we care for you? I live across the street at [facility].  Living wi for signs/symptoms of CO2 retention  Outcome: Progressing     Problem: GASTROINTESTINAL - ADULT  Goal: Minimal or absence of nausea and vomiting  Description  INTERVENTIONS:  - Maintain adequate hydration with IV or PO as ordered and tolerated  - Nasogastr hemorrhage  - Monitor labs and vital signs for trends  - Administer supportive blood products/factors, fluids and medications as ordered and appropriate  - Administer supportive blood products/factors as ordered and appropriate  Outcome: Progressing  Goal: strengthening/mobility  - Encourage toileting schedule  Outcome: Progressing     Problem: DISCHARGE PLANNING  Goal: Discharge to home or other facility with appropriate resources  Description  INTERVENTIONS:  - Identify barriers to discharge w/pt and careg

## 2019-06-08 NOTE — PROGRESS NOTES
[facility]    Progress Note    Christina Mohr Patient Status:  Inpatient    1927 MRN [id number]   Location [facility] 3W/SW Attending Yovani Riley MD   Hosp Day # 13 PCP Elliot Ying MD        Subjective:     Faviola Kim 9\" (1.753 m), weight 170 lb 1.6 oz (77.2 kg), SpO2 91 %. Nursing note and vitals reviewed. Constitutional: He is oriented to person, place, and time. He appears well-developed and well-nourished. HENT:   Head: Normocephalic and atraumatic.    Eyes: Bone marrow bx 6/5: as above     Bradycardia/SSS/>4 second pause  -s/p PPM 6/3/19, L arm sling, site looks good  -cardiology following troponin negative x 2, TSH nl, echo: EF 60%. Cardiology       Acute on Chronic Kidney injury  Repeat BMP today  -     Panc

## 2019-06-08 NOTE — OCCUPATIONAL THERAPY NOTE
OCCUPATIONAL THERAPY TREATMENT NOTE - INPATIENT        Room Number: 330/330-A    Problem List  Principal Problem:    Severe anemia  Active Problems:    Weakness generalized      OCCUPATIONAL THERAPY ASSESSMENT     Pt seen for OT treatment.  Trena Yang TOLERANCE  Pulse: 74  Heart Rate Source: Monitor     BP: 103/62(Sitting 74/57; standing 58/42)   BP Location: Right arm  BP Method: Automatic  Patient Position: Lying    Orthostatic BP as above. Pt was not symptomatic. RN notified.      O2 SATURATIONS  SPO2 Session: Up in chair;Needs met;Call light within reach;RN aware of session/findings; All patient questions and concerns addressed; Alarm set    OT Goals:        Patient will complete functional transfer with SBA  Comment: Progressing    Patient will complete

## 2019-06-08 NOTE — PLAN OF CARE
Problem: Patient Centered Care  Goal: Patient preferences are identified and integrated in the patient's plan of care  Description  Interventions:  - What would you like us to know as we care for you? I live across the street at [facility].  Living wi for signs/symptoms of CO2 retention  Outcome: Progressing     Problem: GASTROINTESTINAL - ADULT  Goal: Minimal or absence of nausea and vomiting  Description  INTERVENTIONS:  - Maintain adequate hydration with IV or PO as ordered and tolerated  - Nasogastr hemorrhage  - Monitor labs and vital signs for trends  - Administer supportive blood products/factors, fluids and medications as ordered and appropriate  - Administer supportive blood products/factors as ordered and appropriate  Outcome: Progressing  Goal: strengthening/mobility  - Encourage toileting schedule  Outcome: Progressing     Problem: DISCHARGE PLANNING  Goal: Discharge to home or other facility with appropriate resources  Description  INTERVENTIONS:  - Identify barriers to discharge w/pt and careg

## 2019-06-09 NOTE — PROGRESS NOTES
Below note from [facility]. Patient was interviewed and examined. Agree with assessment and plan with edits to the document performed as necessary.     [facility] AND [facility]  Progress Note    Albert Gaines Patient Status:  Inpatient    1927 MRN [id number]  General: Alert and oriented in no apparent distress. HEENT: No focal deficits. Neck: No JVD, carotids 2+ no bruits. Cardiac: irregular, 1/6 murmur RUSB  Lungs: Clear without wheezes, rales, rhonchi or dullness. Normal excursions and effort.   Abdome 6/5/19 repeat ureteroscopy and renal pelvic mass biopsy     4. GABE  - creat slightly up to 1.43     5. Pancreatic cystic lesion     6.  Orthostatic hypotension  - hypotension today  - will stop afluzosin, give bolus of normal saline    PLAN:  BP is better a

## 2019-06-09 NOTE — PLAN OF CARE
Problem: Patient Centered Care  Goal: Patient preferences are identified and integrated in the patient's plan of care  Description  Interventions:  - What would you like us to know as we care for you? I live across the street at [facility].  Living wi for signs/symptoms of CO2 retention  Outcome: Progressing     Problem: GASTROINTESTINAL - ADULT  Goal: Minimal or absence of nausea and vomiting  Description  INTERVENTIONS:  - Maintain adequate hydration with IV or PO as ordered and tolerated  - Nasogastr hemorrhage  - Monitor labs and vital signs for trends  - Administer supportive blood products/factors, fluids and medications as ordered and appropriate  - Administer supportive blood products/factors as ordered and appropriate  Outcome: Progressing  Goal: strengthening/mobility  - Encourage toileting schedule  Outcome: Progressing     Problem: DISCHARGE PLANNING  Goal: Discharge to home or other facility with appropriate resources  Description  INTERVENTIONS:  - Identify barriers to discharge w/pt and careg

## 2019-06-09 NOTE — PROGRESS NOTES
[facility]    Progress Note    Cheryl Range Patient Status:  Inpatient    1927 MRN [id number]   Location [facility] 3W/SW Attending Tino Cook MD   Hosp Day # 14 PCP Janelle Josue MD        Subjective: Sitting in source Oral, resp. rate 18, height 5' 9\" (1.753 m), weight 175 lb (79.4 kg), SpO2 93 %. Nursing note and vitals reviewed. Constitutional: He is oriented to person, place, and time. He appears well-developed and well-nourished.    HENT:   Head: [place] Bone marrow bx 6/5: as above     Bradycardia/SSS/>4 second pause  -s/p PPM 6/3/19, L arm sling, site looks good  -cardiology following troponin negative x 2, TSH nl, echo: EF 60%. Cardiology       Acute on Chronic Kidney injury  Repeat BMP today  -     Panc

## 2019-06-09 NOTE — PLAN OF CARE
Problem: Patient Centered Care  Goal: Patient preferences are identified and integrated in the patient's plan of care  Description  Interventions:  - What would you like us to know as we care for you? I live across the street at [facility].  Living wi for signs/symptoms of CO2 retention  Outcome: Progressing     Problem: GASTROINTESTINAL - ADULT  Goal: Minimal or absence of nausea and vomiting  Description  INTERVENTIONS:  - Maintain adequate hydration with IV or PO as ordered and tolerated  - Nasogastr hemorrhage  - Monitor labs and vital signs for trends  - Administer supportive blood products/factors, fluids and medications as ordered and appropriate  - Administer supportive blood products/factors as ordered and appropriate  Outcome: Progressing  Goal: strengthening/mobility  - Encourage toileting schedule  Outcome: Progressing     Problem: DISCHARGE PLANNING  Goal: Discharge to home or other facility with appropriate resources  Description  INTERVENTIONS:  - Identify barriers to discharge w/pt and careg

## 2019-06-10 NOTE — PROGRESS NOTES
[facility] AND [facility]  Progress Note    Car Blazing Patient Status:  Inpatient    1927 MRN [id number]   Location [facility] 3W/SW Attending Gaye Aguirre MD   Hosp Day # 15 PCP Martin Huffman MD     Subjective:  Feels ok    Objective chewable tab 1,000 mg 1,000 mg Oral TID PRN       Assessment and Plan:    1.  Sick sinus syndrome  -  Single chamber PPM placed 6/3/19  - The device is a [employer] model number [id number], serial number [id number].  Ventricular lead is model number 2088TC/58, seria

## 2019-06-10 NOTE — PLAN OF CARE
Problem: Patient Centered Care  Goal: Patient preferences are identified and integrated in the patient's plan of care  Description  Interventions:  - What would you like us to know as we care for you? I live across the street at [facility].  Living wi for signs/symptoms of CO2 retention  Outcome: Progressing     Problem: GASTROINTESTINAL - ADULT  Goal: Minimal or absence of nausea and vomiting  Description  INTERVENTIONS:  - Maintain adequate hydration with IV or PO as ordered and tolerated  - Nasogastr hemorrhage  - Monitor labs and vital signs for trends  - Administer supportive blood products/factors, fluids and medications as ordered and appropriate  - Administer supportive blood products/factors as ordered and appropriate  Outcome: Progressing  Goal: strengthening/mobility  - Encourage toileting schedule  Outcome: Progressing     Problem: DISCHARGE PLANNING  Goal: Discharge to home or other facility with appropriate resources  Description  INTERVENTIONS:  - Identify barriers to discharge w/pt and careg

## 2019-06-10 NOTE — PLAN OF CARE
Problem: Patient Centered Care  Goal: Patient preferences are identified and integrated in the patient's plan of care  Description  Interventions:  - What would you like us to know as we care for you? I live across the street at [facility].  Living wi for signs/symptoms of CO2 retention  Outcome: Progressing     Problem: GASTROINTESTINAL - ADULT  Goal: Minimal or absence of nausea and vomiting  Description  INTERVENTIONS:  - Maintain adequate hydration with IV or PO as ordered and tolerated  - Nasogastr hemorrhage  - Monitor labs and vital signs for trends  - Administer supportive blood products/factors, fluids and medications as ordered and appropriate  - Administer supportive blood products/factors as ordered and appropriate  Outcome: Progressing  Goal: strengthening/mobility  - Encourage toileting schedule  Outcome: Progressing     Problem: DISCHARGE PLANNING  Goal: Discharge to home or other facility with appropriate resources  Description  INTERVENTIONS:  - Identify barriers to discharge w/pt and careg

## 2019-06-10 NOTE — PROGRESS NOTES
Hematology/Oncology follow up note      hgb 8.8 today  Feels okay  No acute events over the weekend     Past Medical History:   Diagnosis Date   • Calculus of kidney    • Esophageal reflux    • Hearing impairment     Right side hearing aid   • High cholest BUN  --  24* 23*   CREATSERUM  --  1.43* 1.19   GFRAA  --  49* 61   GFRNAA  --  43* 53*   CA  --  8.2* 8.1*   ALB 3.13*  --   --    NA  --  139 139   K  --  4.0 4.1   CL  --  104 104   CO2  --  28.0 28.0   TP 5.4*  --   --      Hyperdense enlargement of urology     nephrolithiasis  - no obstructing stones     Pancreatic lesion  - 1.7 cm lesion, will monitor for now    Dispo: will follow along     Christina Mohr is comfortable with my recommendations and had their questions answered to their satisfaction

## 2019-06-11 NOTE — DISCHARGE SUMMARY
[facility] AND CLINICS  Discharge Summary    [facility] Patient Status:  Inpatient    1927 MRN [id number]   Location [facility] 3W/SW Attending Maricarmen Mckeon MD   [address] Day # 16 PCP Charles Quiñones MD     Date of Admission: 2019    Da concentration, agitation and depressed mood.  The patient is not nervous/anxious and is not hyperactive.       History of Present Illness:   30-year-old white male admitted to the ER with severe weakness, shortness of breath, lower extremity edema, along wi hernia  -ppi:    Consultations:   ID, GI, urology, cardiology, hematology/oncology    Procedures:   PPM placement  FNA R iliac wing    Complications: none    Disposition: Final discharge disposition not confirmed    Discharge Condition:stable    Discharge

## 2019-06-11 NOTE — PROGRESS NOTES
[facility] AND [facility]  Progress Note    Lucio Muta Patient Status:  Inpatient    1927 MRN [id number]   Location [facility] 3W/SW Attending Alisa Lou MD   Hosp Day # 16 PCP Barbra Gallegos MD     Subjective:  Feels ok    Objective Flush 0.9 % injection 10 mL 10 mL Intravenous PRN   Vitamin C tab 1,000 mg 1,000 mg Oral Daily   atorvastatin (LIPITOR) tab 20 mg 20 mg Oral Daily   Pantoprazole Sodium (PROTONIX) EC tab 40 mg 40 mg Oral QAM AC   Calcium Carbonate Antacid (TUMS) chewable t

## 2019-06-11 NOTE — PLAN OF CARE
Problem: Patient Centered Care  Goal: Patient preferences are identified and integrated in the patient's plan of care  Description  Interventions:  - What would you like us to know as we care for you? I live across the street at [facility].  Living wi for signs/symptoms of CO2 retention  Outcome: Progressing     Problem: GASTROINTESTINAL - ADULT  Goal: Minimal or absence of nausea and vomiting  Description  INTERVENTIONS:  - Maintain adequate hydration with IV or PO as ordered and tolerated  - Nasogastr hemorrhage  - Monitor labs and vital signs for trends  - Administer supportive blood products/factors, fluids and medications as ordered and appropriate  - Administer supportive blood products/factors as ordered and appropriate  Outcome: Progressing  Goal: strengthening/mobility  - Encourage toileting schedule  Outcome: Progressing     Problem: DISCHARGE PLANNING  Goal: Discharge to home or other facility with appropriate resources  Description  INTERVENTIONS:  - Identify barriers to discharge w/pt and careg

## 2019-06-11 NOTE — PLAN OF CARE
Problem: Patient Centered Care  Goal: Patient preferences are identified and integrated in the patient's plan of care  Description  Interventions:  - What would you like us to know as we care for you? I live across the street at [facility].  Living wi anxiety  - Monitor for signs/symptoms of CO2 retention  Outcome: Adequate for Discharge     Problem: GASTROINTESTINAL - ADULT  Goal: Minimal or absence of nausea and vomiting  Description  INTERVENTIONS:  - Maintain adequate hydration with IV or PO as orde stability  Description  INTERVENTIONS  - Assess for signs and symptoms of bleeding or hemorrhage  - Monitor labs and vital signs for trends  - Administer supportive blood products/factors, fluids and medications as ordered and appropriate  - Administer sup environment to reduce risk of injury  - Provide assistive devices as appropriate  - Consider OT/PT consult to assist with strengthening/mobility  - Encourage toileting schedule  Outcome: Adequate for Discharge     Problem: DISCHARGE PLANNING  Goal: Alireza Melissa scheduled nephrectomy this Friday. Per [facility], their policy is to schedule a follow up heart failure appointment for next week regardless of scheduled procedure for this Friday.

## 2019-06-11 NOTE — PROGRESS NOTES
Hematology/Oncology follow up note      No acute events over the weekend     Past Medical History:   Diagnosis Date   • Calculus of kidney    • Esophageal reflux    • Hearing impairment     Right side hearing aid   • High cholesterol    • Hyperlipidemia CREATSERUM  --  1.43* 1.19   GFRAA  --  49* 61   GFRNAA  --  43* 53*   CA  --  8.2* 8.1*   ALB 3.13*  --   --    NA  --  139 139   K  --  4.0 4.1   CL  --  104 104   CO2  --  28.0 28.0   TP 5.4*  --   --      Hyperdense enlargement of the left upper pole lesion  - 1.7 cm lesion, will monitor for now    Dispo: will follow along     Nusrat Richmond is comfortable with my recommendations and had their questions answered to their satisfaction.  Thank you for allowing me to participate in the care of this patie

## 2019-06-11 NOTE — PROGRESS NOTES
[facility]    Progress Note    Kathe Mehta Patient Status:  Inpatient    1927 MRN [id number]   Location [facility] 3W/SW Attending Jeffrey Spaulding MD   Hosp Day # 16 PCP Agueda Matta MD        Subjective: Sitting in temperature source Oral, resp. rate 16, height 69\", weight 175 lb 6.4 oz, SpO2 92 %. Nursing note and vitals reviewed. Constitutional: He is oriented to person, place, and time. He appears well-developed and well-nourished.    HENT:   Head: Normocepha second pause  -s/p PPM 6/3/19, L arm sling, site looks good  -cardiology following troponin negative x 2, TSH nl, echo: EF 60%. Cardiology       Acute on Chronic Kidney injury  Stable/monitor  -     Pancreatic cystic lesion  -seen on CTa/p: 1.7cm cystic les

## 2019-06-11 NOTE — PROGRESS NOTES
HPI: Marilia Hinkle is a 81 yo male who is a resident of [address] and presented to [address] [place] [address] 5/27/19 with severe weakness, shortness of breath, lower extremity edema, along with a hemoglobin of 4.9.   Patient has been followed at [facility] for chronic anem Code    CURRENT MEDICATIONS: Reviewed on SNF EMR  VITALS: Reviewed   LABS/Imaging: Reviewed     SUBJECTIVE/REVIEW OF SYSTEMS:  Wife and daughter at bedside. Patient denies pain. Denies dizziness. Denies sob/cough/cp/palpitations. Reports normal BMs.  Repor precautions     Pancreatic lesion  - 1.7 cm lesion, monitor for now/outpatient MRCP      GERD/ Large hiatial hernia  -GI consulted inpatient, +large hiatal hernia, no EGD at this time, occult blood negative  -cont ppi     Acute on Chronic Kidney injury  [facility]

## 2019-06-11 NOTE — CM/SW NOTE
RN informed SW that pt is medically stable to discharge today. SW spoke w/ pt's daughter, William Guardado who will transport pt to [facility] at [address] notified of discharge.  Bettyjane Dakin from [facility] stated they will be ready for pt at 2pm.     RN to call r

## 2019-06-12 NOTE — PAT NURSING NOTE
Message left for Ashley at Dr. Juleen Goodpasture office. Saw a note from hematology that they would like his HGB around 10 for surgery. Last HGB from today 8.1. Pacemaker was put in 6/3/19.   Patient has left arm restrictions and he is not supposed to lean on left

## 2019-06-14 PROBLEM — C68.9 UROTHELIAL CANCER (HCC): Status: ACTIVE | Noted: 2019-01-01

## 2019-06-14 NOTE — ANESTHESIA PROCEDURE NOTES
Airway  Urgency: elective      General Information and Staff    Patient location during procedure: OR  Anesthesiologist: Dung Tai MD  Performed: anesthesiologist     Indications and Patient Condition  Indications for airway management: anesthesia  Se

## 2019-06-14 NOTE — ANESTHESIA PROCEDURE NOTES
Arterial Line  Performed by: Erica Castaneda MD  Authorized by: Erica Castaneda MD     Site Identification: surface landmarks    Patient Location:  OR  Indication: continuous blood pressure monitoring and blood sampling needed    Anesthesiologist:  Dez Campos

## 2019-06-14 NOTE — H&P
H&p from 5/26/19 - urology and Dr. Avendano Hand - reviewed    The above referenced H&P was reviewed by Osman Pandey MD on 6/14/2019, the patient was examined and no significant changes have occurred in the patient's condition since the H&P was performed.   Risks an

## 2019-06-14 NOTE — ANESTHESIA PREPROCEDURE EVALUATION
Anesthesia PreOp Note    HPI:     Sujata Borjas is a 80year old male who presents for preoperative consultation requested by: Sharita Gunderson MD    Date of Surgery: 6/14/2019    Procedure(s):  ROBOT-ASSISTED LAPAROSCOPIC NEPHRECTOMY  Indication: Urothelia Ascorbic Acid (VITAMIN C) 1000 MG Oral Tab Take 1 tablet by mouth daily. Disp:  Rfl:  6/13/2019 at Unknown time   Polyethylene Glycol 3350 (MIRALAX) Oral Powd Pack Take 17 g by mouth daily as needed.  Disp:  Rfl:  More than a month at Unknown time       C Physical activity:        Days per week: Not on file        Minutes per session: Not on file      Stress: Not on file    Relationships      Social connections:        Talks on phone: Not on file        Gets together: Not on file        Attends [employer] - normal exam     Pulmonary - negative ROS and normal exam   Cardiovascular - normal exam  (+) pacemaker,     ROS comment: Study Conclusions  1. Left ventricle: The cavity size was normal. Wall thickness was     increased in a pattern of mild LVH.  Panchito Guzmanless

## 2019-06-14 NOTE — OPERATIVE REPORT
[facility] AND CLINICS    Patients Name: Blaiseadiel Waggoner  Attending Physician: Nohelia Barajas MD  CSN: [id number]    Location:  [facility] OR [facility] OR*  MRN: [id number]    YOB: 1927  Admission Date: 6/14/2019     Operative Note    Patient Name:

## 2019-06-14 NOTE — H&P
[facility] HOSP - [facility]    History and Physical    Honor Eans Patient Status:  Surgery Admit - Inpt    1927 MRN [id number]   Location [place] [address] Attending Jason Narayanan MD   Hosp Day # 0 PCP Vern Meza MD     Date daily. Ascorbic Acid (VITAMIN C) 1000 MG Oral Tab Take 1 tablet by mouth daily. Polyethylene Glycol 3350 (MIRALAX) Oral Powd Pack Take 17 g by mouth daily as needed. Review of Systems:     Respiratory: Negative. Cardiovascular: Negative.     Maris Rojas

## 2019-06-14 NOTE — ANESTHESIA PROCEDURE NOTES
Peripheral IV  Inserted by: Juaquin Mcneal MD    Placement  Needle size: 16 G  Laterality: left  Location: forearm  Site prep: alcohol  Attempts: 1

## 2019-06-14 NOTE — ANESTHESIA POSTPROCEDURE EVALUATION
Patient: Lyndsey Crawford    Procedure Summary     Date:  06/14/19 Room / Location:  [address] [place] [address] MAIN OR 07 / [address] [facility] MAIN OR    Anesthesia Start:  1440 Anesthesia Stop:  6779    Procedure:  ROBOT-ASSISTED LAPAROSCOPIC NEPHRECTOMY (Left Abdomen) Diagnosis:       Israel Mccall

## 2019-06-15 NOTE — PLAN OF CARE
Problem: Patient Centered Care  Goal: Patient preferences are identified and integrated in the patient's plan of care  Description  Interventions:  - What would you like us to know as we care for you?  - Provide timely, complete, and accurate information ADULT - FALL  Goal: Free from fall injury  Description  INTERVENTIONS:  - Assess pt frequently for physical needs  - Identify cognitive and physical deficits and behaviors that affect risk of falls. - [place] fall precautions as indicated by assessment.

## 2019-06-15 NOTE — PROGRESS NOTES
[facility] - [facility]    Progress Note    Cheryl Range Patient Status:  Inpatient    1927 MRN [id number]   Location [facility] 4W/SW/SE Attending Tino Cook MD   Hosp Day # 1 PCP Janelle Josue MD        Subjective:     Card documentation in H+P. Based on patients current state of illness, I anticipate that, after discharge, patient will require TBD.       Jennifer Ururtia MD  6/15/2019

## 2019-06-15 NOTE — PLAN OF CARE
Problem: Patient Centered Care  Goal: Patient preferences are identified and integrated in the patient's plan of care  Description  Interventions:  - What would you like us to know as we care for you?  Keep pt updated  - Provide timely, complete, and accu pre-medicate as appropriate  Outcome: Progressing     Problem: RISK FOR INFECTION - ADULT  Goal: Absence of fever/infection during anticipated neutropenic period  Description  INTERVENTIONS  - Monitor WBC  - Administer growth factors as ordered  - Stephanie ability to void and empty bladder  - Monitor intake/output and perform bladder scan as needed  - Follow urinary retention protocol/standard of care  - Consider collaborating with pharmacy to review patient's medication profile  - Implement strategies to pr

## 2019-06-15 NOTE — PROGRESS NOTES
[facility]    Progress Note    Nusrat Basilio Patient Status:  Inpatient    1927 MRN [id number]   Location [facility] 4W/SW/SE Attending Andrzej Camp MD   Hosp Day # 1 PCP Ira Nicholson MD        Subjective:   Harman Traylor ALT 23 06/12/2019    INR 1.10 05/27/2019    T4F 1.1 05/26/2019    TSH 2.830 05/26/2019    TROP <0.045 05/27/2019    B12 653 05/26/2019                      Altagracia Calixto MD  6/15/2019

## 2019-06-16 NOTE — PROGRESS NOTES
[facility]    Progress Note    Manuel Joy Patient Status:  Inpatient    1927 MRN [id number]   Location [facility] 4W/SW/SE Attending Clara Salas MD   Hosp Day # 2 PCP Sarah Ferrell MD         Subjective:   Gil Pod

## 2019-06-16 NOTE — PLAN OF CARE
Problem: Patient Centered Care  Goal: Patient preferences are identified and integrated in the patient's plan of care  Description  Interventions:  - What would you like us to know as we care for you?  Please inform family of room change  - Provide timely pain with activity and pre-medicate as appropriate  Outcome: Progressing     Problem: RISK FOR INFECTION - ADULT  Goal: Absence of fever/infection during anticipated neutropenic period  Description  INTERVENTIONS  - Monitor WBC  - Administer growth factors retention  Description  INTERVENTIONS:  - Assess patient’s ability to void and empty bladder  - Monitor intake/output and perform bladder scan as needed  - Follow urinary retention protocol/standard of care  - Consider collaborating with pharmacy to review

## 2019-06-17 NOTE — CONSULTS
Hematology/Oncology Consult Note        NAME: Chris Mendieta - ROOM: 438/245-N - MRN: [id number] - Age: 80year old - : 1927    Reason for Consult:  Anemia, urothelial carcinoma    Patient is a 80 y.o male who is currently admitted POD day 3 of SpO2 94%   BMI 24.41 kg/m²   Physical Exam:   General: alert, cooperative, no respiratory distress. Head: Normocephalic, without obvious abnormality, atraumatic. Throat: Lips, mucosa, and tongue normal.  No thrush noted.    Lungs: decreased bs bilateral satisfaction. Thank you for allowing me to participate in the care of this patient, please call with any questions.     Connor Gray M.D.  [facility] Hematology and Oncology

## 2019-06-17 NOTE — PROGRESS NOTES
[facility]    Progress Note    Salvatore Evans Patient Status:  Inpatient    1927 MRN [id number]   Location [facility] 4W/SW/SE Attending Liza Keating MD   Hosp Day # 2 PCP Sarahy Villalta MD        Subjective:     Resp 05/27/2019    T4F 1.1 05/26/2019    TSH 2.830 05/26/2019    TROP <0.045 05/27/2019    B12 653 05/26/2019                  **Certification      PHYSICIAN Certification of Need for Inpatient Hospitalization -     Patient will require inpatient services that

## 2019-06-17 NOTE — DISCHARGE SUMMARY
[facility]    Discharge Summary    Blaise Bootheilor Patient Status:  Inpatient    1927 MRN [id number]   Location [facility] 4W/SW/SE Attending Stefania Magaña MD   Hosp Day # 3 PCP Fabiola Stevenson MD     Date of Admission: PO pain medication. Consultations: hospitalist, hem/onc    Procedures: Left robotic radical nephrectomy.       Complications: anemia with transfusion 1U (partial chronic component)    Discharge Condition: Stable         Discharge Medications:      Dis

## 2019-06-17 NOTE — PROGRESS NOTES
Report given to Mary Quinones RN at [facility]. Pt discharged with daughter and all belongings. Left in stable condition.

## 2019-06-17 NOTE — CM/SW NOTE
6/17 114pm: The pt. Has been accepted and there is a bed available at [facility] today 6/17 at 4p. The pt. Stated his dtr. Will provide transportation. Report [phone]  ------------------------  6/17 1133am: AMA met with the pt. At bedside. The pt.

## 2019-06-17 NOTE — PLAN OF CARE
Medical Nutrition Therapy   Metabolic and Bariatric Surgery         Supervised diet and exercise preparation  Visit 5 out of 3  Pt reports: continues to practice appropriate bariatric eating behaviors. Pt currently following structured meal plan nutrition goals 18-22 for weight management. Reviewed with pt. Vitals: Wt Readings from Last 3 Encounters:   09/12/18 (!) 379 lb (171.9 kg)   08/29/18 (!) 377 lb (171 kg)   08/22/18 (!) 374 lb (169.6 kg)     lost 3 lbs over 1 month. Nutrition Assessment:   PES: Knowledge deficit related to healthy behaviors that support weight management post weight loss surgery as evidenced by Body mass index is 67.15 kg/m². Nutrition Assessment of Goal Attainment:  TREATMENT GOALS:    1. Pt  Completed 0 out of 0 goals. 2.TREATMENT GOALS FOR UPCOMING WEEK: continue all previous goals and add: # 0    All goals were planned with and agreed on by the patient. Do you understand your goals? y    Do you have the information you need to achieve your goals? y    Do you have any questions  right now? n        [x]  Consistent goal achievement in the program thus far and further success with goals is expected. []  Unable to consistently make progress in goal achievement. At this time patient is not moving forward  in developing the skills needed for success after surgery. Plan:    Continue to follow monthly and review goals.          []  Nutrition visits complete    [x] Problem: Patient Centered Care  Goal: Patient preferences are identified and integrated in the patient's plan of care  Description  Interventions:  - What would you like us to know as we care for you?   - Provide timely, complete, and accurate informatio with activity and pre-medicate as appropriate  Outcome: Adequate for Discharge     Problem: RISK FOR INFECTION - ADULT  Goal: Absence of fever/infection during anticipated neutropenic period  Description  INTERVENTIONS  - Monitor WBC  - Administer growth f urinary retention  Description  INTERVENTIONS:  - Assess patient?s ability to void and empty bladder  - Monitor intake/output and perform bladder scan as needed  - Follow urinary retention protocol/standard of care  - Consider collaborating with pharmacy t

## 2019-06-17 NOTE — PLAN OF CARE
Problem: Patient Centered Care  Goal: Patient preferences are identified and integrated in the patient's plan of care  Description  Interventions:  - What would you like us to know as we care for you?   - Provide timely, complete, and accurate informatio injury  Description  INTERVENTIONS:  - Assess pt frequently for physical needs  - Identify cognitive and physical deficits and behaviors that affect risk of falls.   - [place] fall precautions as indicated by assessment.  - Educate pt/family on patient sa

## 2019-06-17 NOTE — PROGRESS NOTES
[facility]    Progress Note    Nusratkenyatta Richmond Patient Status:  Inpatient    1927 MRN [id number]   Location [facility] 4W/SW/SE Attending Andrzej Camp MD   Hosp Day # 3 PCP Ira Nicholson MD         Subjective:   Harman Traylor 06/11/19  9470 06/12/19  0629  06/15/19  0549 06/15/19  1455 06/16/19  0546 06/17/19  0524   RBC 3.02* 3.05*   < > 3.28*  --  2.70* 2.69*   HGB 8.0* 8.1*   < > 8.8* 8.0* 7.3* 7.2*   HCT 26.6* 27.5*   < > 29.2* 25.6* 23.7* 23.5*   MCV 88.1 90.2   < > 89.0

## 2019-06-17 NOTE — PHYSICAL THERAPY NOTE
PHYSICAL THERAPY EVALUATION - INPATIENT     Room Number: 464/464-A  Evaluation Date: 6/16/2019  Type of Evaluation: Initial   Physician Order: PT Eval and Treat    Presenting Problem: Urothelial CA s/p neprectomy, anemia, Pacemaker 6/3/19  Reason for Ther services to address these deficits in preparation for discharge. DISCHARGE RECOMMENDATIONS  PT Discharge Recommendations: Sub-acute rehabilitation    PLAN  PT Treatment Plan: Bed mobility; Endurance; Patient education;Gait training;Strengthening;Transfer ASSESSMENT  Ratin  Location: mid back  Management Techniques: Activity promotion; Body mechanics; Relaxation;Repositioning    COGNITION  · Overall Cognitive Status:  WFL - within functional limits    RANGE OF MOTION AND STRENGTH ASSESSMENT  Upper extremi and for more upright posture    Bed Mobility: Min A    Transfers: min A for sit to/from stand and bed to/from chair with sc    Exercise/Education Provided:  Bed mobility  Body mechanics  Energy conservation  Functional activity tolerated  Gait training  Po

## 2019-06-19 NOTE — PROGRESS NOTES
HPI: Emerita Garcia is a 81 yo male who is a resident of [address] and presented to [address] [place] [address] 5/27/19 with severe weakness, shortness of breath, lower extremity edema, along with a hemoglobin of 4.9.  Patient has been followed at [facility] for chronic anem MEDICATIONS: Reviewed on [facility] EMR  VITALS: Reviewed   LABS/Imaging: Reviewed                 SUBJECTIVE/REVIEW OF SYSTEMS:  Seen in room. Denies pain. Denies dizziness. Denies sob/cough/cp/palpitations. Reports normal BMs.  Denies hematuria/dysuria/frequency    Nephrolithiasis  - no obstructing stones      Orthostatic Hypotension  Midodrine 5mg tid, stopped alfuzosin   Monitor bp, Fall precautions     Pancreatic lesion  - 1.7 cm lesion, monitor for now/outpatient MRCP      GERD/ Large hiatial hernia  -GI con

## 2019-06-20 NOTE — PROGRESS NOTES
Called patient  Urothelial carcinoma of kidney  Follow up scheduled    Recommend follow up with oncology also

## 2019-06-21 NOTE — PROGRESS NOTES
HPI: Gilbert Roberts is a 81 yo male who is a resident of "[employer] and presented to [facility] 5/27/19 with severe weakness, shortness of breath, lower extremity edema, along with a hemoglobin of 4.9.  Patient has been followed at [facility] for chronic anem Code     CURRENT MEDICATIONS: Reviewed on [facility] EMR  VITALS: Reviewed   LABS/Imaging: Reviewed     SUBJECTIVE/REVIEW OF SYSTEMS:  Seen in room. Reports no pain. Denies sob/cough/cp/palpitations. Reports normal BMs. Denies hematuria/dysuria/frequency.  Denies 6/3/19  Cardiology consult in rehab    Cont sling      Nephrolithiasis  - no obstructing stones      Orthostatic Hypotension  Midodrine 5mg tid, stopped alfuzosin   Monitor bp, Fall precautions     Pancreatic lesion  - 1.7 cm lesion, monitor for now/outpat

## 2019-06-25 NOTE — PROGRESS NOTES
HPI: Gilbert Roberts is a 79 yo male who is a resident of [facility] and presented to [facility] 5/27/19 with severe weakness, shortness of breath, lower extremity edema, along with a hemoglobin of 4.9.  Patient has been followed at [facility] for chronic anem STATUS:  Full Code     CURRENT MEDICATIONS: Reviewed on [facility] EMR  VITALS: Reviewed   LABS/Imaging: Reviewed     SUBJECTIVE/REVIEW OF SYSTEMS:  Feels well. Reports no pain. Denies sob/cough/cp/palpitations.  Reports normal BMs. Denies hematuria/dysuria/freque s/p pacemaker 6/3/19  Cardiology consult in rehab  following  Cont sling      Nephrolithiasis  - no obstructing stones      Orthostatic Hypotension  Midodrine 5mg tid, stopped alfuzosin   Monitor bp, Fall precautions     Pancreatic lesion  - 1.7 cm lesion,

## 2019-07-02 NOTE — OPERATIVE REPORT
[facility]    PATIENT'S NAME: Himanshu Boudreaux   ATTENDING PHYSICIAN: Amirah Easton MD   OPERATING PHYSICIAN: Erik Bennett MD   PATIENT ACCOUNT#:   [id number]    LOCATION:  [address] [place] [address] #:   [id number]       DATE OF BIRTH: were prepped and draped in sterile fashion. We placed a Lemus catheter in his bladder and then repositioned the patient into right lateral decubitus position. Using double armboards, arms were placed in ergonomic fashion and axillary roll placed.   He had infiltrated with additional Marcaine, and then closed with 4-0 Monocryl in subcuticular fashion. Patient was repositioned supine, awoken from anesthesia, extubated, and taken to postanesthesia care in stable condition. He tolerated the procedure well.

## 2019-11-20 PROBLEM — R59.0 PELVIC LYMPHADENOPATHY: Status: ACTIVE | Noted: 2019-01-01

## 2020-01-01 ENCOUNTER — APPOINTMENT (OUTPATIENT)
Dept: GENERAL RADIOLOGY | Facility: HOSPITAL | Age: 85
DRG: 377 | End: 2020-01-01
Attending: INTERNAL MEDICINE
Payer: MEDICARE

## 2020-01-01 ENCOUNTER — APPOINTMENT (OUTPATIENT)
Dept: GENERAL RADIOLOGY | Facility: HOSPITAL | Age: 85
DRG: 377 | End: 2020-01-01
Attending: EMERGENCY MEDICINE
Payer: MEDICARE

## 2020-01-01 ENCOUNTER — APPOINTMENT (OUTPATIENT)
Dept: CV DIAGNOSTICS | Facility: HOSPITAL | Age: 85
DRG: 377 | End: 2020-01-01
Attending: INTERNAL MEDICINE
Payer: MEDICARE

## 2020-01-01 ENCOUNTER — APPOINTMENT (OUTPATIENT)
Dept: GENERAL RADIOLOGY | Facility: HOSPITAL | Age: 85
DRG: 871 | End: 2020-01-01
Attending: INTERNAL MEDICINE
Payer: MEDICARE

## 2020-01-01 ENCOUNTER — APPOINTMENT (OUTPATIENT)
Dept: PICC SERVICES | Facility: HOSPITAL | Age: 85
DRG: 871 | End: 2020-01-01
Attending: INTERNAL MEDICINE
Payer: MEDICARE

## 2020-01-01 ENCOUNTER — HOSPITAL ENCOUNTER (INPATIENT)
Facility: HOSPITAL | Age: 85
LOS: 7 days | Discharge: HOSPICE | DRG: 871 | End: 2020-01-01
Attending: EMERGENCY MEDICINE | Admitting: INTERNAL MEDICINE
Payer: MEDICARE

## 2020-01-01 ENCOUNTER — HOSPITAL ENCOUNTER (INPATIENT)
Facility: HOSPITAL | Age: 85
LOS: 1 days | DRG: 871 | End: 2020-01-01
Attending: INTERNAL MEDICINE | Admitting: INTERNAL MEDICINE
Payer: OTHER MISCELLANEOUS

## 2020-01-01 ENCOUNTER — APPOINTMENT (OUTPATIENT)
Dept: CT IMAGING | Facility: HOSPITAL | Age: 85
DRG: 377 | End: 2020-01-01
Attending: EMERGENCY MEDICINE
Payer: MEDICARE

## 2020-01-01 ENCOUNTER — HOSPITAL ENCOUNTER (INPATIENT)
Facility: HOSPITAL | Age: 85
LOS: 6 days | Discharge: NURSING, REHAB OR LONG-TERM CARE | DRG: 377 | End: 2020-01-01
Attending: EMERGENCY MEDICINE | Admitting: INTERNAL MEDICINE
Payer: MEDICARE

## 2020-01-01 ENCOUNTER — APPOINTMENT (OUTPATIENT)
Dept: GENERAL RADIOLOGY | Facility: HOSPITAL | Age: 85
DRG: 871 | End: 2020-01-01
Attending: EMERGENCY MEDICINE
Payer: MEDICARE

## 2020-01-01 VITALS
SYSTOLIC BLOOD PRESSURE: 65 MMHG | TEMPERATURE: 98 F | DIASTOLIC BLOOD PRESSURE: 41 MMHG | HEART RATE: 90 BPM | RESPIRATION RATE: 18 BRPM | OXYGEN SATURATION: 82 %

## 2020-01-01 VITALS
OXYGEN SATURATION: 91 % | DIASTOLIC BLOOD PRESSURE: 76 MMHG | HEART RATE: 95 BPM | WEIGHT: 164.25 LBS | BODY MASS INDEX: 22 KG/M2 | RESPIRATION RATE: 23 BRPM | TEMPERATURE: 98 F | SYSTOLIC BLOOD PRESSURE: 117 MMHG

## 2020-01-01 VITALS
BODY MASS INDEX: 22.21 KG/M2 | HEIGHT: 72 IN | OXYGEN SATURATION: 93 % | WEIGHT: 164 LBS | DIASTOLIC BLOOD PRESSURE: 51 MMHG | TEMPERATURE: 98 F | RESPIRATION RATE: 16 BRPM | SYSTOLIC BLOOD PRESSURE: 95 MMHG | HEART RATE: 78 BPM

## 2020-01-01 DIAGNOSIS — J18.9 HAP (HOSPITAL-ACQUIRED PNEUMONIA): ICD-10-CM
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
## 2020-01-01 DIAGNOSIS — R09.02 HYPOXIA: Primary | ICD-10-CM

## 2020-01-01 DIAGNOSIS — N17.9 AKI (ACUTE KIDNEY INJURY) (HCC): ICD-10-CM

## 2020-01-01 DIAGNOSIS — R82.81 PYURIA: ICD-10-CM

## 2020-01-01 DIAGNOSIS — A41.9 SEPSIS, DUE TO UNSPECIFIED ORGANISM, UNSPECIFIED WHETHER ACUTE ORGAN DYSFUNCTION PRESENT (HCC): ICD-10-CM

## 2020-01-01 DIAGNOSIS — D72.829 LEUKOCYTOSIS, UNSPECIFIED TYPE: ICD-10-CM

## 2020-01-01 DIAGNOSIS — D64.9 ANEMIA, UNSPECIFIED TYPE: Primary | ICD-10-CM

## 2020-01-01 PROCEDURE — 99233 SBSQ HOSP IP/OBS HIGH 50: CPT | Performed by: INTERNAL MEDICINE

## 2020-01-01 PROCEDURE — 3E033XZ INTRODUCTION OF VASOPRESSOR INTO PERIPHERAL VEIN, PERCUTANEOUS APPROACH: ICD-10-PCS | Performed by: EMERGENCY MEDICINE

## 2020-01-01 PROCEDURE — 93306 TTE W/DOPPLER COMPLETE: CPT | Performed by: INTERNAL MEDICINE

## 2020-01-01 PROCEDURE — 74177 CT ABD & PELVIS W/CONTRAST: CPT | Performed by: EMERGENCY MEDICINE

## 2020-01-01 PROCEDURE — 71046 X-RAY EXAM CHEST 2 VIEWS: CPT | Performed by: INTERNAL MEDICINE

## 2020-01-01 PROCEDURE — 99232 SBSQ HOSP IP/OBS MODERATE 35: CPT | Performed by: NURSE PRACTITIONER

## 2020-01-01 PROCEDURE — 71045 X-RAY EXAM CHEST 1 VIEW: CPT | Performed by: EMERGENCY MEDICINE

## 2020-01-01 PROCEDURE — 71045 X-RAY EXAM CHEST 1 VIEW: CPT | Performed by: INTERNAL MEDICINE

## 2020-01-01 PROCEDURE — 88108 CYTOPATH CONCENTRATE TECH: CPT | Performed by: PHYSICIAN ASSISTANT

## 2020-01-01 PROCEDURE — 99223 1ST HOSP IP/OBS HIGH 75: CPT | Performed by: NURSE PRACTITIONER

## 2020-01-01 PROCEDURE — 74230 X-RAY XM SWLNG FUNCJ C+: CPT | Performed by: INTERNAL MEDICINE

## 2020-01-01 PROCEDURE — 02HV33Z INSERTION OF INFUSION DEVICE INTO SUPERIOR VENA CAVA, PERCUTANEOUS APPROACH: ICD-10-PCS | Performed by: INTERNAL MEDICINE

## 2020-01-01 PROCEDURE — 99233 SBSQ HOSP IP/OBS HIGH 50: CPT | Performed by: NURSE PRACTITIONER

## 2020-01-01 PROCEDURE — 30233N1 TRANSFUSION OF NONAUTOLOGOUS RED BLOOD CELLS INTO PERIPHERAL VEIN, PERCUTANEOUS APPROACH: ICD-10-PCS | Performed by: INTERNAL MEDICINE

## 2020-01-01 PROCEDURE — 73030 X-RAY EXAM OF SHOULDER: CPT | Performed by: INTERNAL MEDICINE

## 2020-01-01 RX ORDER — MORPHINE SULFATE 2 MG/ML
1 INJECTION, SOLUTION INTRAMUSCULAR; INTRAVENOUS
Status: DISCONTINUED | OUTPATIENT
Start: 2020-01-01 | End: 2020-01-01

## 2020-01-01 RX ORDER — HALOPERIDOL 5 MG/ML
1 INJECTION INTRAMUSCULAR
Status: DISCONTINUED | OUTPATIENT
Start: 2020-01-01 | End: 2020-01-01

## 2020-01-01 RX ORDER — POTASSIUM CHLORIDE 29.8 MG/ML
40 INJECTION INTRAVENOUS ONCE
Status: COMPLETED | OUTPATIENT
Start: 2020-01-01 | End: 2020-01-01

## 2020-01-01 RX ORDER — SODIUM CHLORIDE 450 MG/100ML
INJECTION, SOLUTION INTRAVENOUS CONTINUOUS
Status: DISCONTINUED | OUTPATIENT
Start: 2020-01-01 | End: 2020-01-01

## 2020-01-01 RX ORDER — MORPHINE SULFATE 2 MG/ML
1 INJECTION, SOLUTION INTRAMUSCULAR; INTRAVENOUS EVERY 4 HOURS PRN
Status: DISCONTINUED | OUTPATIENT
Start: 2020-01-01 | End: 2020-01-01

## 2020-01-01 RX ORDER — ALBUTEROL SULFATE 90 UG/1
1 AEROSOL, METERED RESPIRATORY (INHALATION)
Status: DISCONTINUED | OUTPATIENT
Start: 2020-01-01 | End: 2020-01-01

## 2020-01-01 RX ORDER — SCOLOPAMINE TRANSDERMAL SYSTEM 1 MG/1
1 PATCH, EXTENDED RELEASE TRANSDERMAL
Status: DISCONTINUED | OUTPATIENT
Start: 2020-01-01 | End: 2020-01-01

## 2020-01-01 RX ORDER — CIPROFLOXACIN 2 MG/ML
400 INJECTION, SOLUTION INTRAVENOUS EVERY 12 HOURS
Status: DISCONTINUED | OUTPATIENT
Start: 2020-01-01 | End: 2020-01-01

## 2020-01-01 RX ORDER — IPRATROPIUM BROMIDE AND ALBUTEROL SULFATE 2.5; .5 MG/3ML; MG/3ML
3 SOLUTION RESPIRATORY (INHALATION)
OUTPATIENT
Start: 2020-01-01

## 2020-01-01 RX ORDER — ALBUTEROL SULFATE 2.5 MG/3ML
2.5 SOLUTION RESPIRATORY (INHALATION)
Status: DISCONTINUED | OUTPATIENT
Start: 2020-01-01 | End: 2020-01-01

## 2020-01-01 RX ORDER — PANTOPRAZOLE SODIUM 40 MG/1
40 TABLET, DELAYED RELEASE ORAL 2 TIMES DAILY
Qty: 60 TABLET | Refills: 0 | Status: SHIPPED | COMMUNITY
Start: 2020-01-01

## 2020-01-01 RX ORDER — POTASSIUM CHLORIDE 20 MEQ/1
20 TABLET, EXTENDED RELEASE ORAL ONCE
Status: COMPLETED | OUTPATIENT
Start: 2020-01-01 | End: 2020-01-01

## 2020-01-01 RX ORDER — ACETAMINOPHEN 650 MG/1
650 SUPPOSITORY RECTAL EVERY 6 HOURS SCHEDULED
Status: DISCONTINUED | OUTPATIENT
Start: 2020-01-01 | End: 2020-01-01

## 2020-01-01 RX ORDER — ASCORBIC ACID 500 MG
1000 TABLET ORAL DAILY
Status: DISCONTINUED | OUTPATIENT
Start: 2020-01-01 | End: 2020-01-01

## 2020-01-01 RX ORDER — FUROSEMIDE 10 MG/ML
20 INJECTION INTRAMUSCULAR; INTRAVENOUS ONCE
Status: COMPLETED | OUTPATIENT
Start: 2020-01-01 | End: 2020-01-01

## 2020-01-01 RX ORDER — ACETAMINOPHEN 325 MG/1
650 TABLET ORAL EVERY 4 HOURS PRN
Status: DISCONTINUED | OUTPATIENT
Start: 2020-01-01 | End: 2020-01-01

## 2020-01-01 RX ORDER — ONDANSETRON 2 MG/ML
4 INJECTION INTRAMUSCULAR; INTRAVENOUS ONCE
Status: COMPLETED | OUTPATIENT
Start: 2020-01-01 | End: 2020-01-01

## 2020-01-01 RX ORDER — SUCRALFATE 1 G/1
1 TABLET ORAL
Status: DISCONTINUED | OUTPATIENT
Start: 2020-01-01 | End: 2020-01-01

## 2020-01-01 RX ORDER — SUCRALFATE 1 G/1
1 TABLET ORAL
Qty: 120 TABLET | Refills: 0 | Status: SHIPPED | COMMUNITY
Start: 2020-01-01

## 2020-01-01 RX ORDER — POTASSIUM CHLORIDE 20 MEQ/1
40 TABLET, EXTENDED RELEASE ORAL ONCE
Status: COMPLETED | OUTPATIENT
Start: 2020-01-01 | End: 2020-01-01

## 2020-01-01 RX ORDER — ATORVASTATIN CALCIUM 20 MG/1
20 TABLET, FILM COATED ORAL NIGHTLY
Status: DISCONTINUED | OUTPATIENT
Start: 2020-01-01 | End: 2020-01-01

## 2020-01-01 RX ORDER — IPRATROPIUM BROMIDE AND ALBUTEROL SULFATE 2.5; .5 MG/3ML; MG/3ML
3 SOLUTION RESPIRATORY (INHALATION)
Status: DISCONTINUED | OUTPATIENT
Start: 2020-01-01 | End: 2020-01-01

## 2020-01-01 RX ORDER — OMEPRAZOLE 20 MG/1
20 CAPSULE, DELAYED RELEASE ORAL
COMMUNITY

## 2020-01-01 RX ORDER — MORPHINE SULFATE IN 0.9 % NACL 1 MG/ML
1 PLASTIC BAG, INJECTION (ML) INTRAVENOUS CONTINUOUS
Status: DISCONTINUED | OUTPATIENT
Start: 2020-01-01 | End: 2020-01-01

## 2020-01-01 RX ORDER — ONDANSETRON 2 MG/ML
4 INJECTION INTRAMUSCULAR; INTRAVENOUS EVERY 6 HOURS PRN
Status: DISCONTINUED | OUTPATIENT
Start: 2020-01-01 | End: 2020-01-01

## 2020-01-01 RX ORDER — BISACODYL 10 MG
10 SUPPOSITORY, RECTAL RECTAL
Status: DISCONTINUED | OUTPATIENT
Start: 2020-01-01 | End: 2020-01-01

## 2020-01-01 RX ORDER — HEPARIN SODIUM 5000 [USP'U]/ML
5000 INJECTION, SOLUTION INTRAVENOUS; SUBCUTANEOUS EVERY 8 HOURS SCHEDULED
Status: DISCONTINUED | OUTPATIENT
Start: 2020-01-01 | End: 2020-01-01

## 2020-01-01 RX ORDER — ACETAMINOPHEN 325 MG/1
650 TABLET ORAL EVERY 6 HOURS PRN
Status: DISCONTINUED | OUTPATIENT
Start: 2020-01-01 | End: 2020-01-01

## 2020-01-01 RX ORDER — VANCOMYCIN HYDROCHLORIDE
20 EVERY 12 HOURS
Status: DISCONTINUED | OUTPATIENT
Start: 2020-01-01 | End: 2020-01-01

## 2020-01-01 RX ORDER — GLYCOPYRROLATE 0.2 MG/ML
0.4 INJECTION INTRAMUSCULAR; INTRAVENOUS
Status: DISCONTINUED | OUTPATIENT
Start: 2020-01-01 | End: 2020-01-01

## 2020-01-01 RX ORDER — LABETALOL HYDROCHLORIDE 5 MG/ML
10 INJECTION, SOLUTION INTRAVENOUS EVERY 6 HOURS PRN
Status: DISCONTINUED | OUTPATIENT
Start: 2020-01-01 | End: 2020-01-01

## 2020-01-01 RX ORDER — POTASSIUM CHLORIDE 20 MEQ/1
40 TABLET, EXTENDED RELEASE ORAL EVERY 4 HOURS
Status: DISCONTINUED | OUTPATIENT
Start: 2020-01-01 | End: 2020-01-01

## 2020-01-01 RX ORDER — SODIUM CHLORIDE 9 MG/ML
INJECTION, SOLUTION INTRAVENOUS ONCE
Status: COMPLETED | OUTPATIENT
Start: 2020-01-01 | End: 2020-01-01

## 2020-01-01 RX ORDER — DEXTROSE MONOHYDRATE 50 MG/ML
INJECTION, SOLUTION INTRAVENOUS CONTINUOUS
Status: DISCONTINUED | OUTPATIENT
Start: 2020-01-01 | End: 2020-01-01

## 2020-01-01 RX ORDER — VANCOMYCIN HYDROCHLORIDE 125 MG/1
125 CAPSULE ORAL DAILY
Status: DISCONTINUED | OUTPATIENT
Start: 2020-01-01 | End: 2020-01-01

## 2020-01-01 RX ORDER — GUAIFENESIN 100 MG/5ML
100 SOLUTION ORAL EVERY 4 HOURS PRN
Status: DISCONTINUED | OUTPATIENT
Start: 2020-01-01 | End: 2020-01-01

## 2020-01-01 RX ORDER — LORAZEPAM 2 MG/ML
1 INJECTION INTRAMUSCULAR EVERY 4 HOURS PRN
Status: DISCONTINUED | OUTPATIENT
Start: 2020-01-01 | End: 2020-01-01

## 2020-01-01 RX ORDER — BENZONATATE 100 MG/1
100 CAPSULE ORAL 3 TIMES DAILY
Status: DISCONTINUED | OUTPATIENT
Start: 2020-01-01 | End: 2020-01-01

## 2020-01-01 RX ORDER — ONDANSETRON 2 MG/ML
4 INJECTION INTRAMUSCULAR; INTRAVENOUS EVERY 4 HOURS PRN
Status: DISCONTINUED | OUTPATIENT
Start: 2020-01-01 | End: 2020-01-01

## 2020-01-01 RX ORDER — ALBUTEROL SULFATE 2.5 MG/3ML
SOLUTION RESPIRATORY (INHALATION)
Status: DISPENSED
Start: 2020-01-01 | End: 2020-01-01

## 2020-01-01 RX ORDER — PANTOPRAZOLE SODIUM 40 MG/1
40 TABLET, DELAYED RELEASE ORAL 2 TIMES DAILY
Status: DISCONTINUED | OUTPATIENT
Start: 2020-01-01 | End: 2020-01-01

## 2020-01-01 RX ORDER — CIPROFLOXACIN 2 MG/ML
400 INJECTION, SOLUTION INTRAVENOUS EVERY 24 HOURS
Status: DISCONTINUED | OUTPATIENT
Start: 2020-01-01 | End: 2020-01-01

## 2020-01-01 RX ORDER — PANTOPRAZOLE SODIUM 40 MG/1
40 TABLET, DELAYED RELEASE ORAL
Status: ON HOLD | COMMUNITY
End: 2020-01-01

## 2020-01-01 RX ORDER — VANCOMYCIN/0.9 % SOD CHLORIDE 1.75 G/5
25 PLASTIC BAG, INJECTION (ML) INTRAVENOUS ONCE
Status: COMPLETED | OUTPATIENT
Start: 2020-01-01 | End: 2020-01-01

## 2020-01-01 RX ORDER — FAMOTIDINE 10 MG/ML
20 INJECTION, SOLUTION INTRAVENOUS ONCE
Status: COMPLETED | OUTPATIENT
Start: 2020-01-01 | End: 2020-01-01

## 2020-01-01 RX ORDER — DEXTROSE AND SODIUM CHLORIDE 5; .45 G/100ML; G/100ML
INJECTION, SOLUTION INTRAVENOUS CONTINUOUS
Status: DISCONTINUED | OUTPATIENT
Start: 2020-01-01 | End: 2020-01-01

## 2020-01-01 RX ORDER — POTASSIUM CHLORIDE 20 MEQ/1
40 TABLET, EXTENDED RELEASE ORAL EVERY 4 HOURS
Status: COMPLETED | OUTPATIENT
Start: 2020-01-01 | End: 2020-01-01

## 2020-01-01 RX ORDER — ATROPINE SULFATE 10 MG/ML
2 SOLUTION/ DROPS OPHTHALMIC EVERY 2 HOUR PRN
Status: DISCONTINUED | OUTPATIENT
Start: 2020-01-01 | End: 2020-01-01

## 2020-01-01 RX ORDER — POTASSIUM CHLORIDE 14.9 MG/ML
20 INJECTION INTRAVENOUS ONCE
Status: COMPLETED | OUTPATIENT
Start: 2020-01-01 | End: 2020-01-01

## 2020-01-01 RX ORDER — ACETAMINOPHEN 325 MG/1
650 TABLET ORAL ONCE
Status: COMPLETED | OUTPATIENT
Start: 2020-01-01 | End: 2020-01-01

## 2020-01-01 RX ORDER — SODIUM CHLORIDE 0.9 % (FLUSH) 0.9 %
10 SYRINGE (ML) INJECTION AS NEEDED
Status: DISCONTINUED | OUTPATIENT
Start: 2020-01-01 | End: 2020-01-01

## 2020-01-01 NOTE — ED NOTES

## 2020-05-15 PROBLEM — N17.9 AKI (ACUTE KIDNEY INJURY) (HCC): Status: ACTIVE | Noted: 2020-01-01

## 2020-05-15 PROBLEM — R82.81 PYURIA: Status: ACTIVE | Noted: 2020-01-01

## 2020-05-15 PROBLEM — D64.9 ANEMIA, UNSPECIFIED TYPE: Status: ACTIVE | Noted: 2020-01-01

## 2020-05-15 PROBLEM — R11.10 VOMITING: Status: ACTIVE | Noted: 2020-01-01

## 2020-05-15 PROBLEM — D72.829 LEUKOCYTOSIS, UNSPECIFIED TYPE: Status: ACTIVE | Noted: 2020-01-01

## 2020-05-15 NOTE — PROGRESS NOTES
[facility]    CDI Prediction Tool Protocol (Vancomycin Initiated)    OVP (oral vancomycin prophylaxis) 125 mg PO Daily is being started in this patient based on a score of 13.       Score Breakdown:  High risk antibiotic use (5 points)  Malignanc

## 2020-05-15 NOTE — PLAN OF CARE
A/Ox4. Forgetful at times. Fall risk precautions appropriate for pt: bed in lowest position, call light within reach, non-skid socks. SCDs implemented. On 10L  HFNC now  No complaint of pain. MD notified of pt's change in status.  Pulmonolgy in consult Assess patient’s ability to void and empty bladder  - Monitor intake/output and perform bladder scan as needed  - Follow urinary retention protocol/standard of care  - Consider collaborating with pharmacy to review patient's medication profile  - Implement standing, transferring and ambulating w/ or w/o assistive devices  - Assist with transfers and ambulation using safe patient handling equipment as needed  - Ensure adequate protection for wounds/incisions during mobilization  - Obtain PT/OT consults as nee

## 2020-05-15 NOTE — CONSULTS
[facility] Gastroenterology Initial Consult Note     Rojelio Deal  [id number]  12/16/1927    No ref.  provider found    Chief Complaint and History of Present Illness:     Patient presents with:  Nausea/vomiting  Fatigue    89yo mg, 125 mg, Oral, Daily  [START ON 5/16/2020] Ciprofloxacin in D5W (CIPRO) IVPB premix SOLN 400 mg, 400 mg, Intravenous, Q24H  ondansetron HCl (ZOFRAN) injection 4 mg, 4 mg, Intravenous, Q6H PRN  furosemide (LASIX) injection 20 mg, 20 mg, Intravenous, Once 05/15/2020 06:31 AM     05/15/2020 06:31 AM    CO2 25.0 05/15/2020 06:31 AM    BUN 82 (H) 05/15/2020 06:31 AM    CREATSERUM 1.76 (H) 05/15/2020 06:31 AM     (H) 05/15/2020 06:31 AM    ANIONGAP 8 05/15/2020 06:31 AM    No results found for: MG, 16 mm arising from the posterior margin of the upper pole right kidney similar prior exam.  Additional intermediate attenuation 15 mm cyst lower pole region right kidney unchanged from prior exam may reflect a mildly complex cyst AORTA/VASCULAR:   Aorta il benign. 5. Small cystic lesion within the head of the pancreas measuring 1.4 x 1.2 cm similar to prior exam.  The lesion partially obscured by streak artifact related the patient's left arm scanned along the side of the body.   Findings may reflect small cy Please call with any questions.      Teodoro Godwin MD   [facility] gastroenterology

## 2020-05-15 NOTE — ED INITIAL ASSESSMENT (HPI)
patient states he has been throwing up coffee ground emesis since yesterday, states he has also been weak.

## 2020-05-15 NOTE — ED NOTES
Orders for admission, patient is aware of plan and ready to go upstairs.  Any questions, please call ED FIGUEROA Del Rosario at extension [phone]

## 2020-05-15 NOTE — ED PROVIDER NOTES
Patient Seen in: [facility] AND [facility] Emergency Department      History   Patient presents with:  Nausea/vomiting  Fatigue    Stated Complaint:     HPI    80-year-old male with history of sick sinus syndrome, hyperlipidemia, here with complaints of nausea, Negative for abdominal pain and diarrhea. Genitourinary: Negative for dysuria, flank pain and frequency. Musculoskeletal: Negative for back pain. Skin: Negative for rash. Neurological: Negative for weakness, light-headedness and headaches.    All ot strength in b/l UEs and LEs, normal sensation in all extremities, normal finger to nose b/l, normal gait, no facial asymmetry, normal speech               ED Course     EKG    Rate, intervals and axes as noted on EKG Report.   Rate: 81  Rhythm: Sinus Rhythm Absolute Prelim 11.27 (H) 1.50 - 7.70 x10 (3) uL   MANUAL DIFFERENTIAL    Collection Time: 05/14/20  9:23 PM   Result Value Ref Range    Neutrophil Absolute Manual 11.52 (H) 1.50 - 7.70 x10(3) uL    Lymphocyte Absolute Manual 12.01 (H) 1.00 - 4.00 x10(3) u Interval left nephrectomy  - No free fluid      Case faxed at 11:15 PM CT. If there are any questions, please contact me at [phone] (extension [phone]). Hernán Carvajal M.D.   This report has been electronically signed and verified by the Radiologist w initial diagnoses were considered based on the presenting problem including UTI, nausea/vomiting, fatigue, sepsis, gi bleed.      Critical Care Time:  Total critical care time for this patient was 32 minutes exclusive of separately billable procedures, but

## 2020-05-15 NOTE — CONSULTS
Critical Care Consult     Assessment / Plan:  1. Acute hypoxic respiratory failure - concern for pulm edema. Rapid SARS-CoV-2 negative  - wean supplemental O2 as able  - check ABG  - lasix  - stop IVFs  - on empiric ceftriaxone  2.  Acute blood loss anemia atorvastatin 20 MG Oral Tab, Take 1 tablet by mouth nightly.  , Disp: , Rfl: , 5/14/2020 at Unknown time  Ascorbic Acid (VITAMIN C) 1000 MG Oral Tab, Take 1 tablet by mouth daily. , Disp: , Rfl: , 5/14/2020 at Unknown time  acetaminophen 325 MG Oral Tab,

## 2020-05-15 NOTE — ED NOTES
Care assumed from Edinson [facility] [employer]. Pt currently resting comfortably on hospital cart. Pt aware of plan for admission. No distress noted at this time. Will continue to monitor.

## 2020-05-15 NOTE — PROGRESS NOTES
[facility] Pharmacy Note:  Renal Adjustment for ciprofloxacin (CIPRO)    Jero Beltran is a 80year old male who has been prescribed ciprofloxacin (CIPRO) 400 mg every 12 hrs.   CrCl is estimated creatinine clearance is 28.2 mL/min (A) (based on SCr of 1.76 mg/

## 2020-05-16 PROBLEM — C91.10 CHRONIC LYMPHOCYTIC LEUKEMIA (HCC): Status: ACTIVE | Noted: 2019-01-01

## 2020-05-16 PROBLEM — K74.00 LIVER FIBROSIS: Status: ACTIVE | Noted: 2017-04-28

## 2020-05-16 PROBLEM — E78.5 HYPERLIPIDEMIA: Status: ACTIVE | Noted: 2019-01-01

## 2020-05-16 PROBLEM — Z95.0 CARDIAC PACEMAKER IN SITU: Status: ACTIVE | Noted: 2019-01-01

## 2020-05-16 PROBLEM — C64.2 UROTHELIAL CARCINOMA OF KIDNEY, LEFT (HCC): Status: ACTIVE | Noted: 2019-01-01

## 2020-05-16 PROBLEM — I49.5 SICK SINUS SYNDROME (HCC): Status: ACTIVE | Noted: 2019-01-01

## 2020-05-16 NOTE — PROGRESS NOTES
[facility] Gastroenterology Progress Note    Car Arellano  [id number]  12/16/1927    No ref.  provider found    24 hour events   -received diuresis yesterday for pulmonary edema, improved breathing today but remains on high flow °C) (Oral)   Resp 18   Ht 6' (1.829 m)   Wt 164 lb (74.4 kg)   SpO2 92%   BMI 22.24 kg/m²   GENERAL: in no apparent distress, on high flow O2  GI: soft, NT, ND, no guarding/rebound  EXTREMITIES: no edema  Neuro: A&Ox3    LABS and STUDIES:     Lab Results pneumothorax. BONES: Mild degenerative changes of the thoracic spine and shoulders are apparent. Demineralization. OTHER:   Stable left chest wall single lead pacemaker with right ventricular electrode. Large retrocardiac hiatal hernia.          Radha Mckeon or atrophy. ADRENALS: No defined mass or abnormal enlargement. KIDNEYS: Left nephrectomy. .  Right renal cortical lobulation/scarring. Dominant cyst medial margin upper pole measures 3.9 by 3.7 cm.   Smaller high attenuation proteinaceous cyst measuring 1 abdominal aorta and proximal common iliacs. 4. Left nephrectomy. Multiple right renal cortical cysts. Dominant cyst benign appearance upper pole.   Two intermediate attenuation cyst in the interpolar and lower pole region unchanged from previous, probably emesis since admission.      -continue PPI BID IV  -continue carafate QID   -continue with clear liquid diet   -zofran as needed   -will only proceed with EGD if persistent bleeding   -trend H/H and transfuse as needed to keep hgb Loli Perez MD

## 2020-05-16 NOTE — PLAN OF CARE
Problem: Patient Centered Care  Goal: Patient preferences are identified and integrated in the patient's plan of care  Description  Interventions:  - What would you like us to know as we care for you?from [address] with wife  - Provide timely, complete, including rhythm and repeat lab results as appropriate  - Fluid restriction as ordered  - Instruct patient on fluid and nutrition restrictions as appropriate  Outcome: Progressing  Goal: Hemodynamic stability and optimal renal function maintained  Descript

## 2020-05-16 NOTE — PLAN OF CARE
A/Ox4. Forgetful at times. Fall risk precautions appropriate for pt: bed in lowest position, call light within reach, non-skid socks. SCDs in place  On 6L  HFNC   One unit of PRBC given today. Repeat Hg=8.6  No complaint of pain.  No signs of distress at adequate hydration with IV or PO as ordered and tolerated  - Evaluate effectiveness of GI medications  - Encourage mobilization and activity  - Obtain nutritional consult as needed  - Establish a toileting routine/schedule  - Consider collaborating with ph products/factors as ordered and appropriate  Outcome: Progressing     Problem: MUSCULOSKELETAL - ADULT  Goal: Return mobility to safest level of function  Description  INTERVENTIONS:  - Assess patient stability and activity tolerance for standing, transfer

## 2020-05-16 NOTE — PROGRESS NOTES
[facility]    Progress Note    Car Blazing Patient Status:  Inpatient    1927 MRN [id number]   Location [facility] 5SW/SE Attending Gaye Aguirre MD   Hosp Day # 1 PCP Martin Huffman MD        Subjective:     HENT: worsening. Monitor. Avoid nephrotoxins. Status post left nephroureterectomy. CT showed left nephrectomy and multiple right renal cortical cysts. Pyuria  Urine culture negative from 5/14/2020. On ceftriaxone IV. Vomiting/UGI bleeding.   GI fol greater than right perihilar interstitial prominence, increased since previous day. Given background cardiomegaly and mild central pulmonary vascular congestion, interstitial edema is favored.  2. New or progress discoid configuration right basilar airspac distention. Asymmetric bladder wall thickening left side of the bladder dome and left posterior lateral base. Correlate with urinalysis to exclude nonspecific cystitis and or intrinsic lesion.  8. Moderate levoscoliosis and advanced multilevel dorsal lumb

## 2020-05-16 NOTE — PROGRESS NOTES
[address] [facility],[address] Patient Status:  Inpatient    1927 MRN [id number]   Location [facility] 5SW/SE Attending Maricarmen Mckeon MD   Hosp Day # 1 PCP Charles Quiñones MD     Pulm / Critical Care Progress Note     S: pt states his 71* 82* 89*     Creatinine (mg/dL)   Date Value   05/16/2020 1.93 (H)   05/15/2020 1.76 (H)   05/14/2020 1.60 (H)   04/14/2020 1.47 (H)   03/18/2020 1.61 (H)   01/15/2020 1.63 (H)   ]    No results for input(s): ALKPHOS, ALT, AST in the last 168 hours.

## 2020-05-16 NOTE — PLAN OF CARE
Problem: Patient Centered Care  Goal: Patient preferences are identified and integrated in the patient's plan of care  Description  Interventions:  - What would you like us to know as we care for you?   - Provide timely, complete, and accurate informatio ADULT  Goal: Absence of urinary retention  Description  INTERVENTIONS:  - Assess patient’s ability to void and empty bladder  - Monitor intake/output and perform bladder scan as needed  - Follow urinary retention protocol/standard of care  - Consider colla stability  Description  INTERVENTIONS  - Assess for signs and symptoms of bleeding or hemorrhage  - Monitor labs and vital signs for trends  - Administer supportive blood products/factors, fluids and medications as ordered and appropriate  - Administer sup

## 2020-05-17 NOTE — PROGRESS NOTES
[address] [facility],[address] Patient Status:  Inpatient    1927 MRN [id number]   Location [facility] 5SW/SE Attending Tristin Gomez MD   Hosp Day # 2 PCP Latisha Noble MD     Pulm / Critical Care Progress Note     S: pt states he displayed.      Recent Labs   Lab 05/17/20  0650   INR 1.11         Recent Labs   Lab 05/15/20  0631 05/16/20  0722 05/17/20  0650    142 140   K 4.4 3.5 3.3*    106 107   CO2 25.0 28.0 26.0   BUN 82* 89* 72*     Creatinine (mg/dL)   Date Value

## 2020-05-17 NOTE — PLAN OF CARE
A/Ox4. Forgetful at times. Fall risk precautions appropriate for pt: bed in lowest position, call light within reach, non-skid socks. SCDs in place  On 3L  HFNC   On Albuterol treatments. No signs of respiratory distress. No complaint of pain.  No signs nonpharmacologic comfort measures as appropriate  - Advance diet as tolerated, if ordered  - Obtain nutritional consult as needed  - Evaluate fluid balance  5/17/2020 1056 by Gamaliel Gonzalez RN  Outcome: Progressing  5/17/2020 1055 by SWETA Chávez Progressing  Goal: Hemodynamic stability and optimal renal function maintained  Description  INTERVENTIONS:  - Monitor labs and assess for signs and symptoms of volume excess or deficit  - Monitor intake, output and patient weight  - Monitor urine specific safe patient handling equipment as needed  - Ensure adequate protection for wounds/incisions during mobilization  - Obtain PT/OT consults as needed  - Advance activity as appropriate  - Communicate ordered activity level and limitations with patient/family

## 2020-05-17 NOTE — PROGRESS NOTES
[place] [facility] - [facility]    Progress Note    Marilyn Horner Patient Status:  Inpatient    1927 MRN [id number]   Location [facility] 5SW/SE Attending Marta Ledezma MD   Hosp Day # 2 PCP Anyi Chung MD        Subjective:     Kade Juarez unspecified type  Resolved. COVID negative. Chest x-ray with chronic bibasilar markings. Subsegmental atelectasis left perihilar region. May be from acute stressors. Blood culture x 2 pending from 5-16-20. On oral vancomycin prophylaxis.       GABE (acu (L) 04/14/2020    ALKPHO 130 (H) 04/14/2020    BILT 0.37 04/14/2020    TP 6.1 04/14/2020    AST 31 04/14/2020    ALT 19 04/14/2020    INR 1.11 05/17/2020    T4F 1.1 05/26/2019    TSH 2.830 05/26/2019    MG 1.9 05/17/2020    PHOS 2.6 05/17/2020    TROP <0.0

## 2020-05-17 NOTE — PROGRESS NOTES
[facility] HOSP - [facility] Gastroenterology Progress Note    Michaelyn Gilford  [id number]  12/16/1927    No ref.  provider found    24 hour events   -no further emesis   -hgb stable  -continues with diuresis, remains on high flow O2    MEDICAL HIST Location: Right arm)   Pulse 98   Temp 98.3 °F (36.8 °C) (Oral)   Resp 18   Ht 6' (1.829 m)   Wt 164 lb (74.4 kg)   SpO2 91%   BMI 22.24 kg/m²   GENERAL: in no apparent distress, on high flow O2  GI: soft, NT, ND, no guarding/rebound  EXTREMITIES: no edema Discoid configuration right basilar airspace disease. Stable mild hazy left basilar opacity. Interstitial prominence. No pneumothorax. BONES: Mild degenerative changes of the thoracic spine and shoulders are apparent. Demineralization.  OTHER:   Stable retrocardiac hiatal hernia with gastric distention. .  Duodenum unremarkable. PANCREAS: No lesion, fluid collection, ductal dilatation, or atrophy. ADRENALS: No defined mass or abnormal enlargement. KIDNEYS: Left nephrectomy. .  Right renal cortical lobula stool throughout the colon consistent with constipation/fecal retention. 3. Aorta iliac atherosclerosis with ectasia involving the abdominal aorta and proximal common iliacs. 4. Left nephrectomy. Multiple right renal cortical cysts.   Dominant cyst benign was made to hold EGD at this time and treat with PPI/carafate and transfusion as needed.  Will only proceed with EGD if persistent bleeding.       -continue PPI BID IV  -continue carafate QID   -advance diet as tolerated  -zofran as needed   -will only proc

## 2020-05-18 NOTE — OCCUPATIONAL THERAPY NOTE
OCCUPATIONAL THERAPY EVALUATION - INPATIENT     Room Number: 559/559-A  Evaluation Date: 5/18/2020  Type of Evaluation: Initial  Presenting Problem: (anemia)    Physician Order: IP Consult to Occupational Therapy  Reason for Therapy: ADL/IADL Dysfunction a training;Patient/Family education;Patient/Family training;Equipment eval/education; Compensatory technique education       OCCUPATIONAL THERAPY MEDICAL/SOCIAL HISTORY     Problem List  Principal Problem:    Anemia, unspecified type  Active Problems:    Weak dressing/ADL     SUBJECTIVE  Pt agreeable to therapy session     OCCUPATIONAL THERAPY EXAMINATION      OBJECTIVE  Precautions: Bed/chair alarm  Fall Risk: Standard fall risk    PAIN ASSESSMENT  Ratin    O2 SATURATIONS  SPO2 on Room Air at Rest: 98(3L) of Session: Up in chair;Needs met;Call light within reach;RN aware of session/findings; All patient questions and concerns addressed; Alarm set    OT Goals  Patients self stated goal is: go home      Patient will complete functional transfer with mod I   Com

## 2020-05-18 NOTE — PLAN OF CARE
Problem: Patient Centered Care  Goal: Patient preferences are identified and integrated in the patient's plan of care  Description  Interventions:  - What would you like us to know as we care for you?  [facility] with wife  - Provide timely, complete, and patient's medication profile  Outcome: Progressing     Problem: GENITOURINARY - ADULT  Goal: Absence of urinary retention  Description  INTERVENTIONS:  - Assess patient’s ability to void and empty bladder  - Monitor intake/output and perform bladder scan a HEMATOLOGIC - ADULT  Goal: Maintains hematologic stability  Description  INTERVENTIONS  - Assess for signs and symptoms of bleeding or hemorrhage  - Monitor labs and vital signs for trends  - Administer supportive blood products/factors, fluids and medicat

## 2020-05-18 NOTE — PROGRESS NOTES
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

## 2020-05-18 NOTE — SLP NOTE
ADULT VIDEOFLUOROSCOPIC SWALLOWING STUDY    Admission Date: 5/14/2020  Evaluation Date: 05/18/20  Radiologist: Dr. Mary Cho: Regular  Diet Recommendations - Liquid: Nectar thick    Further Follow-up:  Follow hernia. 4. Unipolar pacemaker device. Reason for Referral: R/O aspiration      Family/Patient Goals:  Did not state     ASSESSMENT   DYSPHAGIA ASSESSMENT  Test completed in conjunction with Radiologist.  Patient Positioned: Upright.   Patient Viewed: epiglottis. No reflexive cough noted, but when cued to cough, the patient was able to expel the material from the laryngeal vestibule. Recommend general diet with nectar thick liquids.      FCM Score: 5         GOALS  Goal #1 The patient will tolerate gen

## 2020-05-18 NOTE — SLP NOTE
ADULT SWALLOWING EVALUATION    ASSESSMENT    ASSESSMENT/OVERALL IMPRESSION:  Patient presents with a mild pharyngeal dysphagia but without clinical signs of aspiration.   Oral phase was within normal limits with adequate bilabial seal, timely mastication an liquids  Precautions: Aspiration    Patient/Family Goals: did not state    SWALLOWING HISTORY  Current Diet Consistency: Regular; Thin liquids(soft)  Dysphagia History: none  Imaging Results: 5/17/20 1.  Worsening bibasilar opacities, perhaps indicative of d Slow rate, Small bites, Small sips, Multiple swallows, Alternate liquids/solids, Upright 90 degrees with mild assistance 90 % of the time across 2 sessions. In Progress   Goal #3 Pt will participate in VFSS to rule out aspiration.        FOLLOW UP  Treat

## 2020-05-18 NOTE — CM/SW NOTE
SW informed the pt is a resident at [facility] with his wife. Possible need for rehab placement, with preference for [facility]. Referral sent via [email or web address] and [facility].  Liaison is aware

## 2020-05-18 NOTE — PROGRESS NOTES
[facility]    Progress Note    Lucio Muta Patient Status:  Inpatient    1927 MRN [id number]   Location [facility] 5SW/SE Attending Alisa Lou MD   Hosp Day # 3 PCP Barbra Gallegos MD        Subjective:     Katarzyna Varghese Vanco  CXR Worsening bibasilar opacities, perhaps indicative of developing atelectasis and/or superimposed pneumonia or, potentially, aspiration pneumonitis  Video swallow today       GABE (acute kidney injury) ([facility] [address] / Urothelial Carcinoma   Known by Rajwinder Dash opacities, perhaps indicative of developing atelectasis and/or superimposed pneumonia or, potentially, aspiration pneumonitis in the appropriate clinical setting. 2. Cardiomegaly with pulmonary vascular congestion and pulmonary interstitial edema.   3. Lar

## 2020-05-18 NOTE — PROGRESS NOTES
Pulmonary Progress Note     Assessment / Plan:  1.  Acute hypoxic respiratory failure - concern for pulm edema  - SARS-CoV-2 rapid PCR negative x 2 on 5/15 and 5/16,  negative 5/17  - wean supplemental O2 as able; on 3lpm  - lasix per IM  - hold IVFs

## 2020-05-18 NOTE — PHYSICAL THERAPY NOTE
PHYSICAL THERAPY EVALUATION - INPATIENT     Room Number: 559/559-A  Evaluation Date: 5/18/2020  Type of Evaluation: Initial   Physician Order: PT Eval and Treat    Presenting Problem: weakness, emesis  Reason for Therapy: Mobility Dysfunction and Discharg training;Neuromuscular re-educate;Strengthening;Transfer training;Stoop training;Balance training  Rehab Potential : Good  Frequency (Obs): Daily       PHYSICAL THERAPY MEDICAL/SOCIAL HISTORY       Problem List  Principal Problem:    Anemia, unspecified ty Restriction: None    PAIN ASSESSMENT  Ratin    COGNITION  · Overall Cognitive Status:  WFL - within functional limits    RANGE OF MOTION AND STRENGTH ASSESSMENT  Upper extremity ROM and strength are within functional limits    Lower extremity ROM is wi addressed; Alarm set    CURRENT GOALS    Goals to be met by: 6/1/2020  Patient Goal Patient's self-stated goal is: to improve strength   Goal #1 Patient is able to demonstrate supine - sit EOB @ level: independent     Goal #1   Current Status    Goal #2 Taya Bailey

## 2020-05-18 NOTE — PLAN OF CARE
Problem: Patient Centered Care  Goal: Patient preferences are identified and integrated in the patient's plan of care  Description  Interventions:  - What would you like us to know as we care for you? I would like to return home.   - Provide timely, compl review patient's medication profile  Outcome: Progressing     Problem: GENITOURINARY - ADULT  Goal: Absence of urinary retention  Description  INTERVENTIONS:  - Assess patient’s ability to void and empty bladder  - Monitor intake/output and perform bladder HEMATOLOGIC - ADULT  Goal: Maintains hematologic stability  Description  INTERVENTIONS  - Assess for signs and symptoms of bleeding or hemorrhage  - Monitor labs and vital signs for trends  - Administer supportive blood products/factors, fluids and medicat

## 2020-05-19 NOTE — PLAN OF CARE
Problem: Patient Centered Care  Goal: Patient preferences are identified and integrated in the patient's plan of care  Description  Interventions:  - What would you like us to know as we care for you? I do live at [facility] [employer] with my wife.   - Provide time Maintains or returns to baseline bowel function  Description  INTERVENTIONS:  - Assess bowel function  - Maintain adequate hydration with IV or PO as ordered and tolerated  - Evaluate effectiveness of GI medications  - Encourage mobilization and activity ordered  - Monitor response to interventions for patient's volume status, including labs, urine output, blood pressure (other measures as available)  - Encourage oral intake as appropriate  - Instruct patient on fluid and nutrition restrictions as appropri RN  Outcome: Progressing     Problem: RESPIRATORY - ADULT  Goal: Achieves optimal ventilation and oxygenation  Description  INTERVENTIONS:  - Assess for changes in respiratory status  - Assess for changes in mentation and behavior  - Position to facilitate

## 2020-05-19 NOTE — PROGRESS NOTES
[place] [facility]    Progress Note    [facility] Patient Status:  Inpatient    1927 MRN [id number]   Location [facility] 5SW/SE Attending Anant Alaniz MD   Hosp Day # 4 PCP Severo Child, MD        Subjective:     Yari Mirza appreciated - hold EGD  Hgb 8.5 admission -->7.4 --->cbc ordered in am  S/P PRBC transf  Monitor close       Leukocytosis, unspecified type / CLL  afebrile  WBC 24.5 on admission --> 12.9 today  Known by Oncology - Dr. Margareth Bullard  S/P Lymph node Bx  IV ATB 04/14/2020    AST 31 04/14/2020    ALT 19 04/14/2020    INR 1.11 05/17/2020    T4F 1.1 05/26/2019    TSH 2.830 05/26/2019    MG 1.9 05/17/2020    PHOS 2.6 05/17/2020    TROP <0.045 05/27/2019    B12 751 03/19/2020       Xr Video Swallow (cpt=74230)    Adde

## 2020-05-19 NOTE — PROGRESS NOTES
Pulmonary Progress Note     Assessment / Plan:  1.  Acute hypoxic respiratory failure - concern for pulm edema  - SARS-CoV-2 rapid PCR negative x 2 on 5/15 and 5/16,  negative 5/17  - wean supplemental O2 as able; on 2lpm  - volume management per IM  -

## 2020-05-19 NOTE — PHYSICAL THERAPY NOTE
PHYSICAL THERAPY TREATMENT NOTE - INPATIENT     Room Number: 559/559-A       Presenting Problem: weakness, emesis    Problem List  Principal Problem:    Anemia, unspecified type  Active Problems:    Weakness generalized    Urothelial cancer ([facility] [address]    Leukoc training;Balance training    SUBJECTIVE  Patient agreeable to therapy session     OBJECTIVE  Precautions: Bed/chair alarm    WEIGHT BEARING RESTRICTION  Weight Bearing Restriction: None                PAIN ASSESSMENT   Ratin          BALANCE Goal Patient's self-stated goal is: to improve strength   Goal #1 Patient is able to demonstrate supine - sit EOB @ level: independent     Goal #1   Current Status SBA   Goal #2 Patient is able to demonstrate transfers Sit to/from Stand at assistance level

## 2020-05-19 NOTE — PLAN OF CARE
Problem: Patient Centered Care  Goal: Patient preferences are identified and integrated in the patient's plan of care  Description  Interventions:  - What would you like us to know as we care for you?  I do live at [facility] independent living with my wi collaborating with pharmacy to review patient's medication profile  Outcome: Progressing     Problem: GENITOURINARY - ADULT  Goal: Absence of urinary retention  Description  INTERVENTIONS:  - Assess patient’s ability to void and empty bladder  - Monitor in needed  Outcome: Progressing     Problem: HEMATOLOGIC - ADULT  Goal: Maintains hematologic stability  Description  INTERVENTIONS  - Assess for signs and symptoms of bleeding or hemorrhage  - Monitor labs and vital signs for trends  - Administer supportive

## 2020-05-20 NOTE — PROGRESS NOTES
[facility]    Progress Note    José Manuel Age Patient Status:  Inpatient    1927 MRN [id number]   Location [facility] 5SW/SE Attending Sena Coles MD   Hosp Day # 5 PCP Momo Nina MD        Subjective:     Erica Counts input appreciated - hold EGD  Hgb 8.5 admission -->8.2   S/P PRBC transf  Monitor close       Leukocytosis, unspecified type / CLL  afebrile  WBC 24.5 on admission --> 17.6 today  Known by Oncology - Dr. Dwayne Jones  S/P Lymph node Bx  S/p IV ATBx: ceftriax T4F 1.1 05/26/2019    TSH 2.830 05/26/2019    MG 1.9 05/17/2020    PHOS 2.6 05/17/2020    TROP <0.045 05/27/2019    B12 751 03/19/2020       Xr Video Swallow (xhu=[id number])    Addendum Date: 5/18/2020    ADDENDUM:  Corrected report.   FINDINGS:  PENETRATION

## 2020-05-20 NOTE — SLP NOTE
SPEECH DAILY NOTE - INPATIENT    ASSESSMENT & PLAN   ASSESSMENT  Pt seen for swallowing therapy to monitor swallowing tolerance and train swallowing precautions per VFSS recommendations. Collaborated with RN and speech visit approved.   The RN reports the thick    Compensatory Strategies Recommended: Small bites and sips; Slow rate  Aspiration Precautions: Upright position; Slow rate;Small bites and sips; No straw  Medication Administration Recommendations: Whole in puree;Present with thickened liquid    Patie dysphagia exercises including laryngeal adduction, modified supraglottic swallow technique, mendelsohn maneuver x10 with 90% accuracy. Moderate-max cues presented with dysphagia exercises. Written handout provided on exercises.       Laryngeal Adduction

## 2020-05-20 NOTE — PROGRESS NOTES
Pulmonary Progress Note     Assessment / Plan:  1.  Acute hypoxic respiratory failure - pulm edema  - SARS-CoV-2 rapid PCR negative x 2 on 5/15 and 5/16,  negative 5/17  - wean supplemental O2 as able; on 2lpm  - volume management per IM  - hold IVFs

## 2020-05-20 NOTE — PLAN OF CARE
Problem: Patient Centered Care  Goal: Patient preferences are identified and integrated in the patient's plan of care  Description  Interventions:  - What would you like us to know as we care for you? I do live at [facility] with my wife.   - Provide time pharmacy to review patient's medication profile  Outcome: Progressing     Problem: GENITOURINARY - ADULT  Goal: Absence of urinary retention  Description  INTERVENTIONS:  - Assess patient’s ability to void and empty bladder  - Monitor intake/output and per Progressing     Problem: HEMATOLOGIC - ADULT  Goal: Maintains hematologic stability  Description  INTERVENTIONS  - Assess for signs and symptoms of bleeding or hemorrhage  - Monitor labs and vital signs for trends  - Administer supportive blood products/fa

## 2020-05-21 NOTE — PROGRESS NOTES
Spoke with patient, daughter and spouse about patient's discharge to SNF at [facility]. They expressed understanding. Report called to Indre at [facility]. Peripheral IV removed.

## 2020-05-21 NOTE — PROGRESS NOTES
Pulmonary Progress Note     Assessment / Plan:  1.  Acute hypoxic respiratory failure - pulm edema  - SARS-CoV-2 rapid PCR negative x 2 on 5/15 and 5/16,  negative 5/17  - wean supplemental O2 as able; on 2lpm  - volume management per IM  - empiric cef

## 2020-05-21 NOTE — CM/SW NOTE
AMA informed of dc to snf today. SW confirmed bed at [facility] for today. They are aware of the new O2. No isolation needs. Pt has 3 negative COVID tests. AMA completed snf's COVID form and faxed back to admissions. DC RN completed the PCS.  AMA arranged

## 2020-05-21 NOTE — SLP NOTE
SPEECH DAILY NOTE - INPATIENT    ASSESSMENT & PLAN   ASSESSMENT  Pt seen for swallowing therapy to monitor swallowing tolerance, train swallowing precautions, and complete dysphagia exercises. Collaborated with RN and speech visit approved.   Pt seen for s tongue hold, and mendelsohn maneuver. Swallow-cough-swallow strategy for thin liquids not completed today secondary to pt continuing to require cues to take single sips of NTL. No diet upgrade at this time.   New CXR with moderate cardiomegaly and slight of general diet  consistency and thin liquids without overt signs or symptoms of aspiration with 100 % accuracy over 2 session(s). IF PT CAN USE SWALLOW COUGH SWALLOW STRATEGY    The pt demonstrated difficulty follow single sips with NTL.   Continue to jackie

## 2020-05-21 NOTE — DISCHARGE SUMMARY
[place] [facility] - [facility]    Discharge Summary    Sid Sauer Patient Status:  Inpatient    1927 MRN [id number]   Location [facility] 5SW/SE Attending No att. providers found   Hosp Day # 6 PCP Aleshia Latham MD     Date of Admission: today  Known by Oncology - Dr. Milton Tompkins  S/P Lymph node Bx  S/p IV ATBx: ceftriaxone   cont po Vanco  CXR ordered for today  Video swallow done, results noted       GABE (acute kidney injury) ([facility] [address] / Urothelial Carcinoma   Known by Oncology - Dr. Merced Nguyen Neurological: He is alert and oriented to person, place, and time. No sensory deficit or motor deficit. Skin: Skin is warm. No lesion and no rash noted. Psychiatric: He has a normal mood and affect.  His behavior is normal.           Consultants     P Follow up Labs: CBC.  BMP tomorrow        Shavonne Martinez  Office: [phone]  5/21/2020
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
## 2020-05-24 PROBLEM — A41.9 SEPSIS, DUE TO UNSPECIFIED ORGANISM, UNSPECIFIED WHETHER ACUTE ORGAN DYSFUNCTION PRESENT (HCC): Status: ACTIVE | Noted: 2020-01-01

## 2020-05-24 PROBLEM — R09.02 HYPOXIA: Status: ACTIVE | Noted: 2020-01-01

## 2020-05-24 PROBLEM — J18.9 HAP (HOSPITAL-ACQUIRED PNEUMONIA): Status: ACTIVE | Noted: 2020-01-01

## 2020-05-24 NOTE — CONSULTS
[facility] PULMONARY/CRITICAL CARE CONSULTATION       HPI: Albert Gaines is a 80year old male with a history of CLL, urothelial carcinoma, sick sinus syndrome who who presented to the ER today with hypoxemia and dyspnea.      He was just admitted here from 5/1 Pantoprazole Sodium 40 MG Oral Tab EC   Yes No   Sig: Take 1 tablet (40 mg total) by mouth 2 (two) times a day. acetaminophen 325 MG Oral Tab Unknown at Unknown time  No No   Sig: Take 2 tablets (650 mg total) by mouth every 4 (four) hours as needed.    a CV - regular rate & rhythm. Normal S1, S2. No murmurs, rubs, or gallops appreciated. Abdomen - soft, nontender to palpation. No organomegaly appreciated. Extremities - No cyanosis, clubbing, edema appreciated.       Labs:    Recent Labs   Lab 05/22/20 5. GI - hx of hiatal hernia, recent GI bleed/coffee ground emesis, possibly emetogenic injury - no endoscopy done  -monitor for recurrence  6. Nutrition - dysphagia diet  7. Proph - LMWH, PPI  8.  Dispo - full code  -patient may benefit from palliative care

## 2020-05-24 NOTE — ED PROVIDER NOTES
Patient Seen in: [facility] AND [facility] Emergency Department      History   Patient presents with:  Cough/URI    Stated Complaint:     HPI    History is provided by EMS and patient.     80-year-old male with recently in the hospital for acute respiratory failu congestion, ear pain and sore throat. Eyes: Negative for pain, discharge and redness. Respiratory: Positive for cough and shortness of breath. Negative for wheezing. Cardiovascular: Negative for chest pain.    Gastrointestinal: Negative for abdomina no guarding. Musculoskeletal: Normal range of motion. General: No tenderness. Skin:     General: Skin is warm and dry. Findings: No rash. Neurological:      Mental Status: He is alert and oriented to person, place, and time.       Comment Range    WBC 24.7 (H) 4.0 - 11.0 x10(3) uL    RBC 2.84 (L) 3.80 - 5.80 x10(6)uL    HGB 8.5 (L) 13.0 - 17.5 g/dL    HCT 27.5 (L) 39.0 - 53.0 %    MCV 96.8 80.0 - 100.0 fL    MCH 29.9 26.0 - 34.0 pg    MCHC 30.9 (L) 31.0 - 37.0 g/dL    RDW-SD 57.4 (H) 35.1 - - 11.0 x10(3) uL    RBC 3.22 (L) 3.80 - 5.80 x10(6)uL    HGB 9.5 (L) 13.0 - 17.5 g/dL    HCT 31.1 (L) 39.0 - 53.0 %    MCV 96.6 80.0 - 100.0 fL    MCH 29.5 26.0 - 34.0 pg    MCHC 30.5 (L) 31.0 - 37.0 g/dL    RDW-SD 58.3 (H) 35.1 - 46.3 fL    RDW 16.3 (H) 1 informed him of pt admission  - discussed with Dr. Agapito Collado - informed him of pt consult  - vanc/zosyn ordered  - sepsis fluids held in setting of hypoxia and fluids overload        Medical Record Review: I personally reviewed available prior medical records

## 2020-05-24 NOTE — PLAN OF CARE
Problem: Patient/Family Goals  Goal: Patient/Family Long Term Goal  Description  Patient's Long Term Goal: stayy out of hospital    Interventions:    - See additional Care Plan goals for specific interventions  Outcome: Progressing  Goal: Patient/Family

## 2020-05-24 NOTE — ED INITIAL ASSESSMENT (HPI)
Vanessa Mcgarry arrives via [facility] from [facility] for worsening shortness of breath and hypoxia. He was \"released\" from here 4 days ago.  They found him hypoxic in the miid 80's on his normal 2L02    Patient aox4, extremely hard of hearing with moist cough not

## 2020-05-24 NOTE — PROGRESS NOTES
[facility] Dosing Service    Initial Pharmacokinetic Consult for Vancomycin Dosing     Dagoberto Velásquez is a 80year old male who is being treated for pneumonia.   Pharmacy has been asked to dose Vancomycin by Dr. Martha Jay    He is allergic to penicillins an

## 2020-05-24 NOTE — PROGRESS NOTES
[facility] Pharmacy Note:  Renal Adjustment for piperacillin/tazobactam (Dixie Odor)    Lyndsey Crawford is a 80year old male who has been prescribed piperacillin/tazobactam (ZOSYN) 3.375 gm every 6 hrs.   CrCl is estimated creatinine clearance is 33.3 mL/min (A) (bas

## 2020-05-25 NOTE — PROGRESS NOTES
0Pulmonary Progress Note      NAME: Meenakshi Charles - ROOM: 343/Counts include 234 beds at the [facility] - MRN: [id number] - Age: 80year old - : 1927    Assessment/Plan:  1. Acute hypoxemic respiratory failure - secondary to pneumonia, possible aspiration.   -wean o2 as able  -ant kg)   SpO2 93%   BMI 22.11 kg/m²   General: No distress  Skin: No rashes, no bruising  Eyes: EOMI, no icterus   Ears, Nose, Throat, Mouth: OP clear, MMM,  Neck: Supple, no adenopathy or elevation in JVP  Cardiovascular: RRR, no murmurs or extra heart sound

## 2020-05-25 NOTE — PROGRESS NOTES
[facility]    CDI Prediction Tool Protocol (Vancomycin Initiated)    OVP (oral vancomycin prophylaxis) 125 mg PO Daily is being started in this patient based on a score of 15.       Score Breakdown:  High risk antibiotic use (5 points)  Malignanc

## 2020-05-25 NOTE — SLP NOTE
ADULT SWALLOWING EVALUATION    ASSESSMENT    ASSESSMENT/OVERALL IMPRESSION:  PPE REQUIRED. THIS THERAPIST WORE GOWN, GLOVES ,AND DROPLET MASK FOR DURATION OF EVALUATION. HANDS WASHED UPON ENTRANCE/EXIT. SLP BSSE orders received and acknowledged.  HANSA pope Recommendations - Solids: NPO  Diet Recommendations - Liquid: NPO       Medication Administration Recommendations: Non-oral  Treatment Plan/Recommendations: SLP to reassess  Discharge Recommendations/Plan: Undetermined    HISTORY   MEDICAL HISTORY  Reason Limits    Voice Quality: Weak  Respiratory Status: Supplemental O2;Nasal cannula  Consistencies Trialed: Nectar thick liquids;Puree  Method of Presentation: Staff/Clinician assistance;Spoon  Patient Positioning: Upright;Midline    Oral Phase of Swallow: Im

## 2020-05-25 NOTE — H&P
[facility]    History and Physical    Jero Maríakaro Patient Status:  Inpatient    1927 MRN [id number]   Location [facility] 3W/SW Attending Ashley Gutierrez MD   Hosp Day # 1 PCP Ana Pope MD     Date:  2020  Balwinder Moore Problems Mother      Social History:  Social History    Tobacco Use      Smoking status: Never Smoker      Smokeless tobacco: Never Used    Alcohol use: Never      Frequency: Never    Drug use: Never    Allergies/Medications:    Allergies:   Penicillins There is no rebound and no guarding. Neurological: He is alert and oriented to person, place, and time. No cranial nerve deficit. Skin: Skin is warm.    Psychiatric: His behavior is normal.         Results:     Lab Results   Component Value Date    WBC on 05/25/2020 at 15:56 by Verito Page MD    Ekg 12-lead    Result Date: 5/24/2020  ECG Report  Interpretation  -------------------------- Atrial fibrillation -Nonspecific QRS widening.  -Old anteroseptal infarct.  -Nonspecific ST depression + T-abnormal

## 2020-05-26 NOTE — PLAN OF CARE
Problem: Patient/Family Goals  Goal: Patient/Family Long Term Goal  Description  Patient's Long Term Goal:      Interventions:     - See additional Care Plan goals for specific interventions  Outcome: Progressing  Goal: Patient/Family Short Term Goal  Edilberto Prime

## 2020-05-26 NOTE — PLAN OF CARE
Problem: Patient/Family Goals  Goal: Patient/Family Long Term Goal  Description  Patient's Long Term Goal:     Interventions:  -   - See additional Care Plan goals for specific interventions  Outcome: Not Progressing  Goal: Patient/Family Short Term Goal

## 2020-05-26 NOTE — PLAN OF CARE
Problem: RESPIRATORY - ADULT  Goal: Achieves optimal ventilation and oxygenation  Description  INTERVENTIONS:  - Assess for changes in respiratory status  - Assess for changes in mentation and behavior  - Position to facilitate oxygenation and minimize res

## 2020-05-26 NOTE — PLAN OF CARE
Patient to be transferred to ICU. Report given to [facility]. Notified wife Lynne Oliver and daughter Felipe Oleary. Patient transported with all belongings.

## 2020-05-26 NOTE — CONSULTS
[facility]    Report of Consultation    Emerita Garcia Patient Status:  Inpatient    1927 MRN [id number]   Location [facility] 3W/SW Attending Mir Amanda MD   Hosp Day # 2 PCP Wale Quintana MD     Date of Admission: Once  ipratropium-albuterol (DUONEB) nebulizer solution 3 mL, 3 mL, Nebulization, Q4H WA (4 times daily)  vancomycin HCl (VANCOCIN) cap 125 mg, 125 mg, Oral, Daily  Meropenem (MERREM) 500 mg in sodium chloride 0.9% 100 mL MBP, 500 mg, Intravenous, Q12H  de pulse 94, temperature 97.8 °F (36.6 °C), temperature source Oral, resp. rate 20, weight 160 lb (72.6 kg), SpO2 96 %. Intake/Output:   Last 3 shifts: I/O last 3 completed shifts:   In: 1249 [P.O.:490; I.V.:1250]  Out: 65 [Urine:625; Emesis/NG output:8]   T 05/17/2020    PTP 14.1 05/17/2020    T4F 1.2 05/25/2020    TSH 0.827 05/25/2020    MG 1.9 05/17/2020    PHOS 2.6 05/17/2020    TROP <0.045 05/27/2019    B12 751 03/19/2020         Imaging:  Xr Shoulder, Complete (min 2 Views), Left (cpt=73030)    Result [facility]  Pager: [phone]  Tel: [phone]

## 2020-05-26 NOTE — PROGRESS NOTES
[facility]    Progress Note    Antonio Hannah Patient Status:  Inpatient    1927 MRN [id number]   Location [facility] 5SW/SE Attending Tyler Camacho MD   Hosp Day # 2 PCP Drake Hansen MD        Subjective:     Ryan Stockton Failure/Pneumonia  afebrile  Pulmonary notes reviewed and agreed with/ d/w dr soto  02 protocol  CXR  Pulmonary opacity in the right lower lobe solid suggesting pneumonia/infiltrate.   This finding is denser/worse since prior exam.  There is a trace ri 05/24/2020    AST 39 (H) 05/24/2020    ALT 29 05/24/2020    INR 1.11 05/17/2020    T4F 1.2 05/25/2020    TSH 0.827 05/25/2020    MG 1.9 05/17/2020    PHOS 2.6 05/17/2020    TROP <0.045 05/27/2019    B12 751 03/19/2020       Xr Shoulder, Complete (min 2 Vie

## 2020-05-26 NOTE — CONSULTS
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 SKIN:  No rash or itching. CARDIOVASCULAR:  No chest pain, chest pressure or chest discomfort  RESPIRATORY:  No +shortness of breath, +cough or sputum. GASTROINTESTINAL:  No anorexia, nausea, vomiting or diarrhea. No abdominal pain or blood.   GE ALT 28 29  --   --   --    BILT 0.3 0.4  --   --   --    TP 5.1* 5.5*  --   --   --        Microbiology: Reviewed in EMR    Radiology: Reviewed    ASSESSMENT:    Antibiotics: IV meropenem; (IV vancomycin, IV zosyn)    72-year-old male with a history of C [phone]  5/26/2020

## 2020-05-26 NOTE — SLP NOTE
ADULT SWALLOWING RE-EVALUATION    ASSESSMENT    ASSESSMENT/OVERALL IMPRESSION:    PPE REQUIRED. THIS SLP WORE GLOVES, GOWN AND DROPLET MASK. HANDS SANITIZED/WASHED UPON ENTRANCE/EXIT.       This RE-BSE was completed d/t BSE on 5/25/20 recommending NPO statu BSE results/recommendations discussed with Pt; fair understanding noted. Swallowing precautions written on white board in Pt room. PLAN:    Speech to f/u x3 sessions/meals for dysphagia treatment/aspiration precautions.  Focus will be to ensure s Limits  Strength:  Within Functional Limits  Tone: Within Functional Limits  Range of Motion: Within Functional Limits  Rate of Motion: Within Functional Limits    Voice Quality: Weak;Hoarse  Respiratory Status: Supplemental O2;Nasal cannula  Consistencies Follow-up Date: 05/27/20    Thank you for your referral.   If you have any questions, please contact     Sandip Maya M.S. KRISTEL/SLP  Speech-Language Pathologist  [facility]  #[phone]

## 2020-05-26 NOTE — PROGRESS NOTES
Pulmonary Progress Note     Assessment / Plan:  1. Acute hypoxemic respiratory failure - due to aspiration pneumonia  - wean supplemental O2 as able  - abx as below  - bd protocol  2. ID - aspiration pneumonia; large hiatal hernia could be contributing.  CO

## 2020-05-26 NOTE — PLAN OF CARE
Seen by mds, cont. Isolation & meds, per st npo md reviewed meds. Up w/assist, cont. To monitor.        Problem: RESPIRATORY - ADULT  Goal: Achieves optimal ventilation and oxygenation  Description  INTERVENTIONS:  - Assess for changes in respiratory status

## 2020-05-27 PROBLEM — Z71.89 ADVANCE CARE PLANNING: Status: ACTIVE | Noted: 2020-01-01

## 2020-05-27 PROBLEM — Z71.89 GOALS OF CARE, COUNSELING/DISCUSSION: Status: ACTIVE | Noted: 2020-01-01

## 2020-05-27 PROBLEM — R06.00 DYSPNEA: Status: ACTIVE | Noted: 2020-01-01

## 2020-05-27 PROBLEM — R05.9 COUGH: Status: ACTIVE | Noted: 2020-01-01

## 2020-05-27 NOTE — PROGRESS NOTES
[facility]    Progress Note    Car Blazing Patient Status:  Inpatient    1927 MRN [id number]   Location [facility] 5SW/SE Attending Gaye Aguirre MD   Hosp Day # 3 PCP Martin Huffman MD        Subjective:     Maris Conley and Plan:   Acute respiratory Failure/Pneumonia  Transferred to  PCU, afebrile  Pulmonary notes reviewed and agreed with/ d/w dr soto  02 protocol - wean as tolerated  CXR  Pulmonary opacity in the right lower lobe solid suggesting pneumonia/infiltrat 05/24/2020    ALKPHO 153 (H) 05/24/2020    BILT 0.4 05/24/2020    TP 5.5 (L) 05/24/2020    AST 39 (H) 05/24/2020    ALT 29 05/24/2020    INR 1.11 05/17/2020    T4F 1.2 05/25/2020    TSH 0.827 05/25/2020    MG 1.9 05/17/2020    PHOS 2.6 05/17/2020    TROP <

## 2020-05-27 NOTE — CONSULTS
[address] Patient Status:  Inpatient    1927 MRN [id number]   Location [facility] 2W/SW Attending Sandro Vazquez MD   Hosp Day # 3 PCP Armando Burch MD     Date of Cons not been hospitalized since June 2019 and May 2019.       Substance History:  Smoking Status: never  Hx of Substance Use/Abuse: none    Allergies:    Penicillins             HIVES  Penicillin G                Comment:Dona Allergy Text Annotation: Yobany Crawford need to be thickened given patient's dysphagia diet. Goals of Care discussion/Advance Care Planning counseling and discussion:  See below.      Past Medical History:   Diagnosis Date   • Anemia    • Calculus of kidney    • CLL (chronic lymphocytic leuke (LOPRESSOR) tab 12.5 mg, 12.5 mg, Oral, 2x Daily(Beta Blocker)  •  ondansetron HCl (ZOFRAN) injection 4 mg, 4 mg, Intravenous, Q4H PRN  •  benzonatate (TESSALON) cap 100 mg, 100 mg, Oral, TID  No current outpatient medications on file. Hematology:   See Toussaint assess    Disposition: ongoing goals of care discussions    Goals of Care discussion/Advance Care Planning counseling and discussion:  With patient, I discussed reason for palliative care consultation.  I discussed the benefits of palliative care to include patient's recent functional decline and his multiple co-morbidities. We also talked about his other recent hospitalization.     We spent a lot of time talking about 's dysphagia and the potential for recurrent aspiration pneumonia/respiratory failure emotional support to pt/daughter who seem to be coping adequately  -See above narrative for further details     Advance care planning counseling/discussion  -Full code; no limits set at this time - POLST form e-mailed to Minnie Morton at [email or web address]  -Uncle

## 2020-05-27 NOTE — PROGRESS NOTES
[facility]    Charlotte Haji Patient Status:  Inpatient    1927 MRN [id number]   Location [facility] 2W/SW Attending Carlos A Eisenberg MD   Hosp Day # 3 PCP Yuliana Haddad MD     Subjective:  JANEEN re Hypoxia     HAP (hospital-acquired pneumonia)     Sepsis, due to unspecified organism, unspecified whether acute organ dysfunction present ([facility] [address]     Dyspnea     Cough     Goals of care, counseling/discussion     Advance care planning      ASSESSMENT:    An

## 2020-05-27 NOTE — PROGRESS NOTES
Patient seen in follow up. Coughing and sob. Had vomiting earlier.  Mild RVR.   05/27/20  1600   BP: 100/69   Pulse: 91   Resp: 25   Temp:        Intake/Output Summary (Last 24 hours) at 5/27/2020 1627  Last data filed at 5/27/2020 1350  Gross per 24 atorvastatin 20 MG Oral Tab, Take 1 tablet by mouth nightly. Ascorbic Acid (VITAMIN C) 1000 MG Oral Tab, Take 1 tablet by mouth daily. Pantoprazole Sodium 40 MG Oral Tab EC, Take 1 tablet (40 mg total) by mouth 2 (two) times a day.   acetaminophen 325 M

## 2020-05-27 NOTE — SLP NOTE
PPE REQUIRED. THIS SLP WORE GOWN, GLOVES, AND DROPLET MASK. HANDS SANITIZED/WASHED UPON ENTRANCE/EXIT. SLP f/u for ongoing meal assessment. Pt's PM meal at bedside. Pt declines any trials from tray  as he states, \"I can't keep anything down.  I'm going

## 2020-05-27 NOTE — PROGRESS NOTES
Pulmonary Progress Note     Assessment / Plan:  1. Acute hypoxemic respiratory failure - due to aspiration pneumonia  - wean supplemental O2 as able  - abx as below  - bd protocol  2.  Sepsis - due to pneumonia  - IVFs as below  - normal lactic acid  - abx

## 2020-05-27 NOTE — DIETARY NOTE
ADULT NUTRITION INITIAL ASSESSMENT    Pt is at moderate nutrition risk. Pt meets severe malnutrition criteria.       CRITERIA FOR MALNUTRITION DIAGNOSIS:  Criteria for severe malnutrition diagnosis: acute illness/injury related to wt loss greater than 5% i (hospital-acquired pneumonia) [J18.9, Y95]  Sepsis, due to unspecified organism, unspecified whether acute organ dysfunction present [facility] [A41.9]    PERTINENT PAST MEDICAL HISTORY:   Past Medical History:   Diagnosis Date   • Anemia    • Calculus of kidne Intravenous Q12H   • vitamin C  1,000 mg Oral Daily   • atorvastatin  20 mg Oral Nightly   • sucralfate  1 g Oral TID & HS   • Heparin Sodium (Porcine)  5,000 Units Subcutaneous Q8H [address]   • metoprolol Tartrate  12.5 mg Oral 2x Daily(Beta Blocker)   • benzo

## 2020-05-28 NOTE — PROGRESS NOTES
[facility]    Sujata Cuff Patient Status:  Inpatient    1927 MRN [id number]   Location [facility] 2W/SW Attending Jesus Campbell MD   Hosp Day # 4 PCP Andrew Raya MD     Subjective:  JANEEN re kidney, left (HCC)     Acute respiratory failure with hypoxia (HCC)     Hypoxia     HAP (hospital-acquired pneumonia)     Sepsis, due to unspecified organism, unspecified whether acute organ dysfunction present ([facility] [address]     Dyspnea     Cough     Goals of care

## 2020-05-28 NOTE — PROGRESS NOTES
Patient seen in follow up. Now with levo for hypotension.  CCT 30 min.   05/28/20  1230   BP: 97/69   Pulse: 104   Resp: 22   Temp:        Intake/Output Summary (Last 24 hours) at 5/28/2020 1238  Last data filed at 5/28/2020 0600  Gross per 24 hour omeprazole 20 MG Oral Capsule Delayed Release, Take 20 mg by mouth 2 (two) times daily before meals. sucralfate 1 g Oral Tab, Take 1 tablet (1 g total) by mouth TID & HS.  atorvastatin 20 MG Oral Tab, Take 1 tablet by mouth nightly.     Ascorbic Acid (CORI 3. A fib new onset patient high risk for bleeding advise ASA 81 at least when safer, continue heparin SC, BP too low to increase BB, continue for now metoprolol 12.5 po BID     4.  GABE  - likely multifactorial, might need fluids, primary team managing.

## 2020-05-28 NOTE — PROGRESS NOTES
Vascular Access Note    Vascular Access Screening:   Allergies to Lidocaine: no  Allergies to Latex: no  Presence of Pacemaker/Defibrillator: Left Side  Mastectomy with Lymph Node Dissection: No  AV Fistula / AV Graft: No  Dialysis Catheter: No  Central Orvis Rosin

## 2020-05-28 NOTE — PROGRESS NOTES
[facility]    Progress Note    Kitty Short Patient Status:  Inpatient    1927 MRN [id number]   [facility] 5SW/SE Attending Jackie Ro MD   Hosp Day # 4 PCP Anmol Ha MD        Subjective:     Ryan Gomez has a normal mood and affect.          Assessment and Plan:   Acute respiratory Failure/Pneumonia  PCU, afebrile, still coughing   Pulmonary notes reviewed and agreed with/ d/w dr soto  02 protocol - wean as tolerated - still on 15L HFNC  CXR today  IV CO2 34.0 (H) 05/28/2020     (H) 05/28/2020    CA 8.7 05/28/2020    ALB 2.4 (L) 05/24/2020    ALKPHO 153 (H) 05/24/2020    BILT 0.4 05/24/2020    TP 5.5 (L) 05/24/2020    AST 39 (H) 05/24/2020    ALT 29 05/24/2020    INR 1.11 05/17/2020    T4F 1.2 05

## 2020-05-28 NOTE — CM/SW NOTE
BREANNA received- advanced directives. SW reviewed pt's chart. SW aware that pt lives at [facility]. Pt with recent admission at [facility] earlier this month with d/c to [address] [place] [address] on 5/21/20.   Pt plans on return to [facility] to continue

## 2020-05-28 NOTE — PLAN OF CARE
Problem: Patient/Family Goals  Goal: Patient/Family Long Term Goal  Description  Patient's Long Term Goal: to go back to [place]    Interventions:  -   - See additional Care Plan goals for specific interventions  Outcome: Not Progressing  Goal: Shaheen Maintain adequate hydration with IV or PO as ordered and tolerated  - Nasogastric tube to low intermittent suction as ordered  - Evaluate effectiveness of ordered antiemetic medications  - Provide nonpharmacologic comfort measures as appropriate  - Advance

## 2020-05-28 NOTE — PALLIATIVE CARE NOTE
[facility]  Palliative Care Follow Up    Kate Nicely Patient Status:  Inpatient    1927 MRN [id number]   Location [facility] 2W/SW Attending Erin Rico MD   Hosp Day # 4 PCP Caleb Sin MD     Date of Consult: 0 then awake and alert   Orientation: x 3  Appearance: appropriately groomed  Affect: normal/mildly anxious  Judgment: normal.    Allergies:    Penicillins             HIVES  Penicillin G                Comment:Dona Allergy Text Annotation: penicillin    Lexi Harder (H) 05/28/2020    BUN 54 (H) 05/28/2020     (H) 05/28/2020    K 4.2 05/28/2020     05/28/2020    CO2 34.0 (H) 05/28/2020     (H) 05/28/2020    CA 8.7 05/28/2020    ALB 2.4 (L) 05/24/2020    ALKPHO 153 (H) 05/24/2020    BILT 0.4 05/24/202 feeling overwhelmed with his health problems. Terezanaga Prieto verbalized agreement with a DNR/DNI status for [employer]. She tells me that she, her mother, and her brother are all on board with this.  Tereza Prieto, otherwise, wants us to continue supportive medical treatments in details     Palliative Performance Scale 40%     Palliative Care Follow-up:  I spent a total of 35 minutes with the patient today, which included all of the following:direct face to face contact, history taking, physical examination, and >50% was spent cou

## 2020-05-28 NOTE — PROGRESS NOTES
Pulmonary Progress Note     Assessment / Plan:  1. Acute hypoxemic respiratory failure - due to aspiration pneumonia. CXR with worsening pneumonia  - wean supplemental O2 as able; on 10 LPM via HFNC  - abx as below  - bd protocol  2.  Septic shock - due to

## 2020-05-29 NOTE — PALLIATIVE CARE NOTE
[facility]  Palliative Care Follow Up    Salvatore Evans Patient Status:  Inpatient    1927 MRN [id number]   Location [facility] 2W/SW Attending Liza Keating MD   Hosp Day # 5 PCP Sarahy Villalta MD     Date of Consult: 0 Annotation: penicillin    Medications:     Current Facility-Administered Medications:   •  dextrose 5% infusion, , Intravenous, Continuous  •  guaiFENesin (ROBITUSSIN) 100 MG/5ML solution 100 mg, 100 mg, Oral, Q4H PRN  •  norepinephrine (LEVOPHED) 4 mg/250 05/24/2020    MG 2.1 05/29/2020    PHOS 2.6 05/17/2020    TROP <0.045 05/27/2019       Imaging:  Xr Chest Ap Portable  (cpt=71045)    Result Date: 5/28/2020  CONCLUSION:  1. Right subclavian PICC line tip ends in the cavoatrial junction. No pneumothorax. with bradycardia       Leukocytosis, unspecified type / CLL/? sepsis       Anemia, acute blood loss/Upper GI Bleed       GABE (acute kidney injury) ([facility] [address] / Urothelial Carcinoma        Hypotension      Pancreatic cyst      BPH     COVID-19 negative      Dysp

## 2020-05-29 NOTE — PLAN OF CARE
Problem: Patient/Family Goals  Goal: Patient/Family Long Term Goal  Description  Patient's Long Term Goal: to [facility]    Interventions:  - coordinate care with social service  - See additional Care Plan goals for specific interventions Progressing, requiring high flow 12l nc, short of breath with adls, productive cough     - Provide timely, complete, and accurate information to patient/family  - Incorporate patient and family knowledge, values, beliefs, and cultural backgrounds into the

## 2020-05-29 NOTE — PROGRESS NOTES
[facility]    Progress Note    Pura Kuo Patient Status:  Inpatient    1927 MRN [id number]   Location [facility] 5SW/SE Attending Juma Quevedo MD   Hosp Day # 5 PCP Dolores De La Rosa MD        Subjective:     Sandra Godinez lesion and no rash noted. Psychiatric: He has a normal mood and affect.          Assessment and Plan:   Acute respiratory Failure/Pneumonia  PCU, afebrile, still coughing   Pulmonary notes reviewed and agreed with/ d/w dr soto  02 protocol - wean as Results:     Lab Results   Component Value Date    WBC 16.2 (H) 05/29/2020    HGB 8.3 (L) 05/29/2020    HCT 26.8 (L) 05/29/2020    .0 05/29/2020    CREATSERUM 2.78 (H) 05/29/2020    BUN 63 (H) 05/29/2020     05/29/2020    K 3.2 (L) 05/29/202

## 2020-05-29 NOTE — PROGRESS NOTES
Pulmonary Progress Note     Assessment / Plan:  1. Acute hypoxemic respiratory failure - due to aspiration pneumonia. CXR with worsening pneumonia  - wean supplemental O2 as able; on 13 LPM via HFNC  - abx as below  - bd protocol  2.  Septic shock - due to

## 2020-05-29 NOTE — PROGRESS NOTES
Patient seen in follow up. Still hypotensive.  CCT 30 min.   05/29/20  1200   BP:    Pulse:    Resp:    Temp: (!) 96.4 °F (35.8 °C)       Intake/Output Summary (Last 24 hours) at 5/29/2020 1248  Last data filed at 5/29/2020 0600  Gross per 24 hour   I omeprazole 20 MG Oral Capsule Delayed Release, Take 20 mg by mouth 2 (two) times daily before meals. sucralfate 1 g Oral Tab, Take 1 tablet (1 g total) by mouth TID & HS.  atorvastatin 20 MG Oral Tab, Take 1 tablet by mouth nightly.     Ascorbic Acid (CORI - The device is a [employer] model number [id number], serial number [id number].  Ventricular lead is model number [id number], serial number [id number].  The device measured the R wave at 6.1 mV, impedance of 590 ohms, threshold at 0.75 at 0.4 ms.      2.  Aspiration p

## 2020-05-29 NOTE — PLAN OF CARE
Problem: GASTROINTESTINAL - ADULT  Goal: Minimal or absence of nausea and vomiting  Description  INTERVENTIONS:  - Maintain adequate hydration with IV or PO as ordered and tolerated  - Nasogastric tube to low intermittent suction as ordered  - Evaluate e threatening arrhythmias  - Monitor electrolytes and administer replacement therapy as ordered  Outcome: Not Progressing     Pt continues to have strong, moist, productive cough with large amounts of tan sputum.  O2 sats difficult to read, increased to 15L h

## 2020-05-29 NOTE — SLP NOTE
ADULT SWALLOWING RE-EVALUATION    ASSESSMENT    ASSESSMENT/OVERALL IMPRESSION:    PPE REQUIRED. THIS SLP WORE GLOVES, GOWN AND DROPLET MASK. HANDS SANITIZED/WASHED UPON ENTRANCE/EXIT.     This RE-BSE was completed d/t Pt with change in medical status and tr dysphagia and probable pharyngeal dysfunction/suspect aspiration. Per [facility] - GIGI Scale, Pt's swallow function is Level 2 of 7. Recommend downgrade to NPO status. Collaborated with RN regarding Pt's swallowing plan of care.  RN to discuss Pt's status with MD. ALISIA r EXAMINATION  Dentition: Natural  Symmetry: Within Functional Limits  Strength: (overall reduced)  Tone: (overall reduced)  Range of Motion: (overall reduced)  Rate of Motion: Reduced    Voice Quality: (essentially aphonic during entire session )  Respirato

## 2020-05-30 NOTE — PROGRESS NOTES
Pulmonary Progress Note     Assessment / Plan:  1. Acute hypoxemic respiratory failure - due to aspiration pneumonia. CXR with worsening pneumonia.  Has large amount of ongoing sputum production  - wean supplemental O2 as able; on 12 LPM via HFNC  - continu EMR    Imaging:  I independently visualized all relevant chest imaging in PACS and agree with radiology interpretation except where noted.

## 2020-05-30 NOTE — PLAN OF CARE
Problem: Patient/Family Goals  Goal: Patient/Family Long Term Goal  Description  Patient's Long Term Goal: to be able to go back to [facility]    Interventions:  - aspiration precaution  Encouraged deep breathing exercises  - See additional Care Pl would you like us to know as we care for you?   - Provide timely, complete, and accurate information to patient/family  - Incorporate patient and family knowledge, values, beliefs, and cultural backgrounds into the planning and delivery of care  - Encourag

## 2020-05-30 NOTE — PROGRESS NOTES
[facility]    Progress Note    Dagoberto Velásquez Patient Status:  Inpatient    1927 MRN [id number]   Location [facility] 2W/SW Attending Kristopher Infante MD   Hosp Day # 6 PCP Michelle Lizarraga MD        Subjective:     Constit There is no tenderness. No hernia. Musculoskeletal: Normal range of motion. Neurological: He is alert. He displays normal reflexes. No cranial nerve deficit or motor deficit. Coordination normal.   Skin: Skin is warm.            Assessment and Plan: (H) 05/30/2020     05/30/2020    K 4.0 05/30/2020     05/30/2020    CO2 29.0 05/30/2020     (H) 05/30/2020    CA 8.6 05/30/2020    ALB 1.8 (L) 05/30/2020    ALKPHO 153 (H) 05/24/2020    BILT 0.4 05/24/2020    TP 5.5 (L) 05/24/2020    AST

## 2020-05-30 NOTE — SLP NOTE
New orders received and acknowledged. Per RN, speech to f/u on 5/31/20 when Pt's respiratory status more stable. Will see Pt on 5/31/20 as appropriate.          Sandip Maya M.S. CCC/SLP  Speech-Language Pathologist  [facility]  #[phone]

## 2020-05-30 NOTE — CONSULTS
[facility]    Report of Consultation    Lyndsey Crawford Patient Status:  Inpatient    1927 MRN [id number]   Location [facility] 2W/SW Attending Chu Hanna MD   Hosp Day # 6 PCP Jennifer Urrutia MD     Date of Admission: Family History  Family History   Problem Relation Age of Onset   • No Known Problems Father    • No Known Problems Mother        Social History  Patient Guardians:  Not on file    Other Topics            Concern    None on file    Social History Narr every 4 (four) hours as needed. Allergies    Penicillins             HIVES  Penicillin G                Comment:Cerner Allergy Text Annotation: penicillin    Review of Systems:   Review of systems not obtained due to patient factors.      Physical Ex 5/28/2020 at 1:26 PM     Finalized by (CST): Letitia Navarro MD on 5/28/2020 at 1:27 PM          Xr Chest Ap Portable  (cpt=71045)    Result Date: 5/28/2020  CONCLUSION:  1.  Interval worsening in the chest with development of moderate right upper lobe airsp

## 2020-05-31 PROBLEM — J69.0 ASPIRATION PNEUMONIA (HCC): Status: ACTIVE | Noted: 2020-01-01

## 2020-05-31 NOTE — SLP NOTE
SLP orders received and acknowledged. Chart reviewed. RN reports pt requiring frequent oral suctioning and not appropriate for swallow assessment at this time. Recommend continue NPO with oral care 3x daily.  SLP to f/u with assessment of swallow function a

## 2020-05-31 NOTE — PROGRESS NOTES
[facility]    Progress Note    Jero Beltran Patient Status:  Inpatient    1927 MRN [id number]   Location [facility] 2W/SW Attending Ashley Gutierrez MD   Hosp Day # 7 PCP Ana Pope MD       Subjective:   [place] WBC 11.7 (H) 05/30/2020    HGB 8.1 (L) 05/30/2020    HCT 26.1 (L) 05/30/2020    .0 (L) 05/30/2020    CREATSERUM 3.59 (H) 05/31/2020    BUN 73 (H) 05/31/2020     (H) 05/31/2020    K 3.8 05/31/2020     05/31/2020    CO2 31.0 05/31/2020

## 2020-05-31 NOTE — PROGRESS NOTES
Patient admitted into inpatient hospice. Morphine given for labored breathing as needed. IV Rubinol given for secretions PRN. Daughter and wife remained at bedside. O2 at 13L per HF NC. Will continue to monitor.

## 2020-05-31 NOTE — PLAN OF CARE
Problem: CARDIOVASCULAR - ADULT  Goal: Absence of cardiac arrhythmias or at baseline  Description  INTERVENTIONS:  - Continuous cardiac monitoring, monitor vital signs, obtain 12 lead EKG if indicated  - Evaluate effectiveness of antiarrhythmic and heart or ABGs  - Provide Smoking Cessation handout, if applicable  - Encourage broncho-pulmonary hygiene including cough, deep breathe, Incentive Spirometry  - Assess the need for suctioning and perform as needed  - Assess and instruct to report SOB or any respi

## 2020-05-31 NOTE — PLAN OF CARE
Patient awake, states \"I can't breathe\", raspy voice. Unable to manage secretions and phlegm, frequent deep suctioning. SpO2 fluctuating from 82-94% depending on airway clearance. HOB >30 degrees. NPO. IV D5 infusing. Fatigued, weak.  Turn Q2H, sacral m Problem: SKIN/TISSUE INTEGRITY - ADULT  Goal: Skin integrity remains intact  Description  INTERVENTIONS  - Assess and document risk factors for pressure ulcer development  - Assess and document skin integrity  - Monitor for areas of redness and/or skin b

## 2020-05-31 NOTE — PROGRESS NOTES
Skin assessed with PCT Ace prior to transfer. Report given to FIGUEROA Castorena. Daughter at bedside.

## 2020-05-31 NOTE — PLAN OF CARE
Pt  received in bed. Alert x2. 12 L via high flow nasal cannula. D5w infusing at 50 ml/hour. NPO maintained. Oral suctioned produced thick tan secretions. Call light within reach.    Problem: CARDIOVASCULAR - ADULT  Goal: Absence of cardiac arrhythmias or a Monitor for areas of redness and/or skin breakdown  - Initiate interventions, skin care algorithm/standards of care as needed  Outcome: Progressing     Problem: Patient/Family Goals  Goal: Patient/Family Long Term Goal  Description  Patient's [address]

## 2020-05-31 NOTE — PROGRESS NOTES
Pulmonary Progress Note     Assessment / Plan:  1. Acute hypoxemic respiratory failure - due to aspiration pneumonia. CXR with worsening pneumonia. Has large amount of ongoing sputum production.  Worse today  - wean supplemental O2 as able; on 11 LPM via HF visualized all relevant chest imaging in PACS and agree with radiology interpretation except where noted.

## 2020-05-31 NOTE — PROGRESS NOTES
[facility]    Progress Note    Manuel Joy Patient Status:  Inpatient    1927 MRN [id number]   Location [facility] 2W/SW Attending Clara Salas MD   Hosp Day # 7 PCP Sarah Ferrell MD        Subjective:     Constit hepatosplenomegaly. There is no tenderness. No hernia. Musculoskeletal: Normal range of motion. Neurological: He is alert. He displays normal reflexes. No cranial nerve deficit or motor deficit. Coordination normal.   Skin: Skin is warm.            Assess 05/30/2020    CREATSERUM 3.59 (H) 05/31/2020    BUN 73 (H) 05/31/2020     (H) 05/31/2020    K 3.8 05/31/2020     05/31/2020    CO2 31.0 05/31/2020     (H) 05/31/2020    CA 9.0 05/31/2020    ALB 1.8 (L) 05/30/2020    ALKPHO 153 (H) 05/24/

## 2020-06-01 NOTE — CM/SW NOTE
Received call from RN, pt . Hospice Team provided support to pts family (spouse and dtr) at bedside. Offered condolences, provided emotional support and provided  resources as requested.

## 2020-06-01 NOTE — PROGRESS NOTES
Patient's wife and daughter were present at the time of death. Provided pastoral care for family. Completed release form. No additional follow up is needed.        06/01/20 6132   Clinical Encounter Type   Visited With Family   Routine Visit Introduction

## 2020-06-01 NOTE — H&P
[facility]    History and Physical    Lucio Muta Patient Status:  Inpatient    1927 MRN [id number]   Location [facility] 2W/SW Attending No att. providers found   Hosp Day # 2 PCP Barbra Gallegos MD     Date:  2020 Text Annotation: penicillin  omeprazole 20 MG Oral Capsule Delayed Release, Take 20 mg by mouth 2 (two) times daily before meals. sucralfate 1 g Oral Tab, Take 1 tablet (1 g total) by mouth TID & HS.   Pantoprazole Sodium 40 MG Oral Tab EC, Take 1 tablet ( for Hospice     Sick Sinus Syndrome/A fib with bradycardia  / Hypotension  S/p Pacemaker - stable - monitor  Appropriate for Hospice      CLL / Anemia, acute blood loss/Upper GI Bleed  Hgb 8.1  S/P PRBC transf on previous admission  Appropriate for Hospice

## 2020-06-01 NOTE — SIGNIFICANT EVENT
Pt  at [facility]. Resusitation not attempted as pt was DNR.     Time of Death 12    Family Notified Yes  Name and Relation wife & daughter at bedside    MD Elmo Thompson of [facility] Notified Yes [id number] Juan Daniel Kemp)     contacted if applicable n/a

## 2020-06-01 NOTE — DISCHARGE SUMMARY
[facility] [place] [facility]    Discharge Summary    Jenny Ordoñez Patient Status:  Inpatient    1927 MRN [id number]   Location [facility] 2W/SW Attending No att. providers found   Hosp Day # 7 PCP Vern Meza MD     Date of Admission: development of moderate right upper lobe airspace disease suggesting right upper lobe pneumonia.  Slight decrease in right basilar airspace disease suggesting some decreasing pneumonia/atelectasis.   Continue with iv abx , vet qid , keep npo , large amount Neurological: He is alert. No sensory deficit or motor deficit. Skin: Skin is warm. No rash noted. Psychiatric: He has a normal mood and affect.  His behavior is normal.           Consultants     Provider Role Specialty    Ventura Laws MD Consult

## 2020-06-01 NOTE — H&P
The original note was entered in error and has been removed. To inquire about the original note, please contact the [facility] Department at ([phone]).

## 2020-06-01 NOTE — CM/SW NOTE
MSW and Hospice Rn's rounded to see pt 2x today; pt was unresponsive continues to decline. Respiratory needs addressed with floor RN. Comfort Mesures continuing to address pts EOL needs. MSW provided supportive presence.   Also spoke with pts [email or web address]

## 2020-06-01 NOTE — PROGRESS NOTES
05/31/20 2031   Clinical Encounter Type   Visited With Patient and family together  (wife and daughter present)   Routine Visit   (Consult)   Continue Visiting Yes   Patient Spiritual Encounters   Spiritual Assessment Completed No  (Pt was focused on ea

## 2020-06-01 NOTE — CM/SW NOTE
CM self-referred for Readmission and dc planning. Pt has been readmitted to [facility] under [facility]. Residential hospice team following patient at this time. Lidia White.  Donis Interiano RN, BSN  Nurse   [phone]

## 2020-06-01 NOTE — PLAN OF CARE
Patient drowsy throughout most of the night. Remained on 12L O2 HFNC. PRN morphine for dyspnea/labored breathing. PRN robinul for increased secretions. Comfort care measures provided.      Problem: COPING  Goal: Pt/Family able to verbalize concerns and demo

## 2020-06-03 NOTE — DISCHARGE SUMMARY
[place] [facility] [place] [facility]    Discharge Summary    Kathe Mehta Patient Status:  Inpatient    1927 MRN [id number]   Location [facility] 4W/SW/SE Attending No att. providers found   Hosp Day # 1 PCP Agueda Matta MD     Date of Admiss held  Hospice eval - Appropriate for Hospice     Sick Sinus Syndrome/A fib with bradycardia  / Hypotension  S/p Pacemaker - stable - monitor  Appropriate for Hospice      CLL / Anemia, acute blood loss/Upper GI Bleed  Hgb 8.1  S/P PRBC transf on previous a

## 2020-06-13 NOTE — PROGRESS NOTES
Seen with Janeen Sandy NP  [facility]  Progress Note    Lori Kelechi Patient Status:  Inpatient    1927 MRN [id number]   Location [facility] 2W/SW Attending Alivia Mitchell MD   Hosp Day # 6 PCP Shavonne Martinez MD     Subjective ventricular hypertrophy.   -Old anteroseptal infarct. - T-abnormality -Lateral ischemia. ABNORMAL    5/16/20 Echo  Study Conclusions  1. Left ventricle: The cavity size was normal. Wall thickness was     increased in a pattern of mild LVH.  Systolic func push, 40 mg, Intravenous, Q12H  acetaminophen (TYLENOL) tab 650 mg, 650 mg, Oral, Q4H PRN  Vitamin C tab 1,000 mg, 1,000 mg, Oral, Daily  atorvastatin (LIPITOR) tab 20 mg, 20 mg, Oral, Nightly  sucralfate (CARAFATE) tab 1 g, 1 g, Oral, TID & HS  Heparin So intact

## 2024-10-25 NOTE — HOSPICE RN NOTE
Admitted [facility] hospice with aspiration PNA. Aert x 2. Lethargic and dozing off. Exhausted from difficulty breathing. Unable to talk above a whisper and says only a couple of words such as he loves his wife. Dyspnea and respirations labored. Rate 27/min.  Caitlin Coats
GIP day 2. Pt in unresponsive, on continuous oxygen 12L high flow. Spoke with Dr. Christiano Zhang, order received for morphine drip. Pt RR is 32 and labored with congestion in bilateral lungs. Discussed POC with floor RN.
Non family member

## 2024-12-29 NOTE — PLAN OF CARE
Bed: 10  Expected date:   Expected time:   Means of arrival:   Comments:  Triage    Problem: Patient Centered Care  Goal: Patient preferences are identified and integrated in the patient's plan of care  Description  Interventions:  - What would you like us to know as we care for you?  \"I live at [employer] with my wife\"  - Provide time pharmacy to review patient's medication profile  Outcome: Progressing     Problem: GENITOURINARY - ADULT  Goal: Absence of urinary retention  Description  INTERVENTIONS:  - Assess patient’s ability to void and empty bladder  - Monitor intake/output and per Progressing     Problem: HEMATOLOGIC - ADULT  Goal: Maintains hematologic stability  Description  INTERVENTIONS  - Assess for signs and symptoms of bleeding or hemorrhage  - Monitor labs and vital signs for trends  - Administer supportive blood products/fa

## (undated) DEVICE — SUTURE PROLENE 4-0 RB-1

## (undated) DEVICE — ISOVUE 300 10X100ML VIAL

## (undated) DEVICE — INSUFFLATION NEEDLE TO ESTABLISH PNEUMOPERITONEUM.: Brand: INSUFFLATION NEEDLE

## (undated) DEVICE — OPEN-END URETERAL CATHETER: Brand: COOK

## (undated) DEVICE — VISUALIZATION SYSTEM: Brand: CLEARIFY

## (undated) DEVICE — DRAPE SHEET LAPCHOLE 124X100X7

## (undated) DEVICE — MONOPOLAR CURVED SCISSORS: Brand: ENDOWRIST;DAVINCI SI

## (undated) DEVICE — TIP COVER ACCESSORY

## (undated) DEVICE — ENCORE® LATEX ACCLAIM SIZE 7.5, STERILE LATEX POWDER-FREE SURGICAL GLOVE: Brand: ENCORE

## (undated) DEVICE — SUCTION CANISTER, 3000CC,SAFELINER: Brand: DEROYAL

## (undated) DEVICE — FLEXOR, URETERAL ACCESS SHEATH WITH AQ, HYDROPHILIC COATING: Brand: FLEXOR

## (undated) DEVICE — DISPOSABLE SUCTION/IRRIGATOR TUBE SET: Brand: AHTO

## (undated) DEVICE — SOL H2O 1000ML BTL

## (undated) DEVICE — SOL  .9 1000ML BTL

## (undated) DEVICE — SUTURE VICRYL 4-0 RB-1

## (undated) DEVICE — CONTAINER SPEC STR 4OZ GRY LID

## (undated) DEVICE — TISSUE RETRIEVAL SYSTEM: Brand: INZII RETRIEVAL SYSTEM

## (undated) DEVICE — SUTURE VICRYL 0 UR-6

## (undated) DEVICE — ENCORE® LATEX MICRO SIZE 7, STERILE LATEX POWDER-FREE SURGICAL GLOVE: Brand: ENCORE

## (undated) DEVICE — ENDOSCOPIC VALVE WITH ADAPTER.: Brand: SURSEAL® II

## (undated) DEVICE — PRECISE BIPOLAR FORCEPS: Brand: ENDOWRIST;DAVINCI SI

## (undated) DEVICE — GAMMEX® PI HYBRID SIZE 7.5, STERILE POWDER-FREE SURGICAL GLOVE, POLYISOPRENE AND NEOPRENE BLEND: Brand: GAMMEX

## (undated) DEVICE — SOL H2O 3000ML IRRIG

## (undated) DEVICE — SUTURE VICRYL 0 J340H

## (undated) DEVICE — MEDI-VAC NON-CONDUCTIVE SUCTION TUBING: Brand: CARDINAL HEALTH

## (undated) DEVICE — UROLOGY DRAIN BAG

## (undated) DEVICE — DV KIT ACCESSORY 4-ARM

## (undated) DEVICE — SUTURE VICRYL 0

## (undated) DEVICE — SHEATH ACC 12FR DIL HUB FNL

## (undated) DEVICE — LARGE NEEDLE DRIVER: Brand: ENDOWRIST;DAVINCI SI

## (undated) DEVICE — PROGRASP FORCEPS: Brand: ENDOWRIST;DAVINCI SI

## (undated) DEVICE — CASED DISP BIPOLAR CORD

## (undated) DEVICE — ROBOTIC: Brand: MEDLINE INDUSTRIES, INC.

## (undated) DEVICE — SUTURE VICRYL 3-0 SH

## (undated) DEVICE — SUTURE PDS II 0 CT-1

## (undated) DEVICE — ENCORE® LATEX MICRO SIZE 6.5, STERILE LATEX POWDER-FREE SURGICAL GLOVE: Brand: ENCORE

## (undated) DEVICE — PLUMEPORT ACTIV LAPAROSCOPIC SMOKE FILTRATION DEVICE: Brand: PLUMEPORT ACTIVE

## (undated) DEVICE — TROCAR: Brand: KII FIOS FIRST ENTRY

## (undated) DEVICE — SUTURE SILK 0

## (undated) DEVICE — CLIP HEMOLOK LARGE PURPLE

## (undated) DEVICE — CHLORAPREP 26ML APPLICATOR

## (undated) DEVICE — DV OBTURATOR BLADELESS 8MM

## (undated) DEVICE — DERMABOND LIQUID ADHESIVE

## (undated) DEVICE — BIGOPSY BACKLOADING BIOPSY FORCEPS: Brand: BIGOPSY

## (undated) DEVICE — SUTURE SILK 2-0 SA85H

## (undated) DEVICE — NON-ADHERENT PAD PREPACK: Brand: TELFA

## (undated) DEVICE — NITINOL WIRE STR 035

## (undated) DEVICE — CYSTO PACK: Brand: MEDLINE INDUSTRIES, INC.

## (undated) DEVICE — VIOLET BRAIDED (POLYGLACTIN 910), SYNTHETIC ABSORBABLE SUTURE: Brand: COATED VICRYL

## (undated) DEVICE — SUTURE MONOCRYL 4-0 PS-2

## (undated) DEVICE — 60 ML SYRINGE,TOOMEY TYPE: Brand: MONOJECT

## (undated) NOTE — LETTER
[facility]  Administration of Anesthesia  1. Fer Marcus, or _________________________________ acting on his behalf, (Patient) (Dependent/Representative) request to receive anesthesia for my pending procedure/operation/treatment. infections, high spinal block, spinal bleeding, seizure, cardiac arrest and death. 7. AWARENESS: I understand that it is possible (but unlikely) to have explicit memory of events from the operating room while under general anesthesia.   8. ELECTROCONVULSIV unconscious pt /Relationship    My signature below affirms that prior to the time of the procedure, I have explained to the patient and/or his/her guardian, the risks and benefits of undergoing anesthesia, as well as any reasonable alternatives.     _______

## (undated) NOTE — IP AVS SNAPSHOT
Patient Demographics     Address  [address] [place] [address]  Liset Byers [address] Phone  [phone] [facility])      Emergency Contact(s)     Name Relation Home Work Mobile    ArmandoSharon Spouse [phone]      Karen Huffman Daughter   [phone] somewhat larger opening is needed if an entire kidney is removed. The physician may also use a robotic system to perform this surgery.     In some cases the fatty tissues surrounding the kidney and a portion of the ureter (the tube connecting the kidney to first post-up visit. You will be walking soon after your surgery. This is very important to your recovery. The pain medication will make it easier for you to move around. What to Do After Your Procedure  ?  Continue to ambulate, but do not overexert you ? Incision becomes red, tender or swollen  ? Pus-like drainage from the incision or drain site   ? Temperature over 100.5° F (orally) for two readings taken four hours apart   ? Decrease in urine output   ?  Your urine becomes so bloody that you cannot see Specialty:  Internal Medicine  Contact information:  [address] [facility]  [address] [facility] [phone]             [address] [facility]. Go on 6/20/2019.     Why:  FOLLOW-UP APPOINTMENT SCHEDULED W/FRANKIE ([facility] CARDIOVSCULAR OFFICE) 6/20/ Order ID Medication Name Action Time Action Reason Comments    [id number] Midodrine HCl (PROAMATINE) tab 5 mg 06/10/19 1804 Given      [id number] Midodrine HCl (PROAMATINE) tab 5 mg 06/11/19 0700 Given      [id number] Midodrine HCl (PROAMATINE) tab 5 mg 06/1 RBC Morphology See morphology below Normal, Slide reviewed, see previous RBC morphology.  [facility]   Platelet Morphology Normal Normal — [facility]   Macrocytosis 1+ — — [facility]   Microcytosis 1+ — — [facility]   Hypochromia 1+ — — [facility] CD20, dim CD22, CD23, CD45, and dim surface kappa immunoglobulin light chain restriction.      The smaller of the two populations (approximately 10% of total B lymphocytes) demonstrates expression of dim-moderate CD11c, bright CD19, very bright CD20, very b CD5-, CD10- monoclonal B-cell population identified on recent cytomorphologic and flow cytometric examination of peripheral blood (6/4/2019).       Considering this history, the morphologic and immunophenotypic findings of the current bone marrow biopsy exa ---------                               -----------         ------                     LEUKEMIA/LYMPHOMA FLOW (T)[[id number]]                       Final result               [facility] [place] FLOW CYTOMETRY SEHR Guardado [[id number]]                      Final result Electronically signed by Priscilla Marie MD on 6/5/2019  7:33 AM   Attribution Connolly    AP. 1 - Priscilla Marie MD on 6/5/2019  7:33 AM                        Consults - MD Consult Notes      Consults signed by Molina Nath DO at 6/5/2019  2:52 PM      Author: LUNGS:  Clear to auscultation anteriorly. HEART:  S1, S2 is regular. No murmurs appreciated. ABDOMEN:  Soft and nontender. EXTREMITIES:  1+ bilateral edema. Positive pulses appreciated. NEUROLOGIC:  Alert and appropriate. SKIN:  Without rashes.     L No notes of this type exist for this encounter. Video Swallow Study Notes    No notes of this type exist for this encounter. SLP Notes    No notes of this type exist for this encounter.      Immunizations     Name Date      INFLUENZA 10/27/17

## (undated) NOTE — IP AVS SNAPSHOT
[facility]            (For Outpatient Use Only) Initial Admit Date: 5/26/2019   Inpt/Obs Admit Date: Inpt: 5/26/19 / Obs: N/A   Discharge Date:    Marian Cormier:  [de-identified]   MRN: [de-identified]   CSN: [id number]   CEID: [id number]        RPT Hospital Account Financial Class: Medicare    June 11, 2019

## (undated) NOTE — LETTER
Torin [address]ill [address]  [facility] [address], [place], [place]  [address]  INFORMED CONSENT FOR TRANSFUSION OF BLOOD OR BLOOD PRODUCTS  My physician has informed me of the nature, purpose, benefits and risks of transfusion for blood and blood components that ______________________________________________  (Signature of Patient)                                                            (Responsible party in case of Minor,

## (undated) NOTE — LETTER
[address] [facility]     I agree to have a Peripherally Inserted Central Catheter (PICC) placed in my arm.    1. The PICC insertion procedure, care, maintenance, risks, benefits, and complications have been explained to me b also discussed reasonable alternatives to the PICC, including risks, benefits, and side effects related to the alternatives and risks related to not receiving this procedure.     8.  I have expressed any questions about this procedure to my physician or the

## (undated) NOTE — LETTER
[address] [place] [address]  Authorization for Invasive Procedures  1.  I hereby authorize Dr. Kinjal Jaramillo guided Bone Marrow Biopsy , my physician and whomever may be designated as the doctor's assistant, to perform the following performed for the purposes of advancing medicine, science, and/or education, provided my identity is not revealed. If the procedure has been videotaped, the physician/surgeon will obtain the original videotape.  The hospital will not be responsible for stor My signature below affirms that prior to the time of the procedure, I have explained to the patient and/or his legal representative, the risks and benefits involved in the proposed treatment and any reasonable alternative to the proposed treatment.  I have

## (undated) NOTE — LETTER
[facility], [place]  Authorization for Invasive Procedures  1.  I hereby authorize Dr. Licha Gama , my physician and whomever may be designated as the doctor's assistant, to perform the following operation and/or procedure: cystoscopy, a directed donor transfusion, I will discuss this with my physician.      5. I consent to the photographing of the operations or procedures to be performed for the purposes of advancing medicine, science, and/or education, provided my identity is not reveal Witness Signature: ____________________________________________ Date: __________ Time: ___________    Statement of Physician  My signature below affirms that prior to the time of the procedure, I have explained to the patient and/or his legal representativ

## (undated) NOTE — LETTER
[address]  [facility] [address], [place], [place]  [address]  INFORMED CONSENT FOR TRANSFUSION OF BLOOD OR BLOOD PRODUCTS  My physician has informed me of the nature, purpose, benefits and risks of transfusion for blood and blood components that ______________________________________________  (Signature of Patient)                                                            (Responsible party in case of Minor,

## (undated) NOTE — LETTER
[facility], [place]  Authorization for Invasive Procedures  1.  I hereby authorize Dr. Julio Cesar Hernandez , my physician and whomever may be designated as the doctor's assistant, to perform the following operation and/or procedure: raul 5. I consent to the photographing of the operations or procedures to be performed for the purposes of advancing medicine, science, and/or education, provided my identity is not revealed.  If the procedure has been videotaped, the physician/surgeon will obta __________ Time: ___________    Statement of Physician  My signature below affirms that prior to the time of the procedure, I have explained to the patient and/or his legal representative, the risks and benefits involved in the proposed treatment and any r

## (undated) NOTE — IP AVS SNAPSHOT
Patient Demographics     Address  [address] [facility]  [address] Phone  [phone] [facility])  [phone] (Mobile) *Preferred*      Emergency Contact(s)     Name Relation Home Work Mobile    [place] Spouse [phone] Order ID Medication Name Action Time Action Reason Comments    [id number] CefTRIAXone Sodium (ROCEPHIN) 1 g in sodium chloride 0.9% 100 mL MBP/ADD-vantage 05/21/20 1122 New Bag      [id number] Pantoprazole Sodium (PROTONIX) EC tab 40 mg 05/20/20 2121 Given Sodium 143 136 - 145 mmol/L — [facility])   Potassium 4.5 3.5 - 5.1 mmol/L — [facility])   Chloride 112 98 - 112 mmol/L — [facility] ([facility])   CO2 26.0 21.0 - 32.0 mmol/L — [facility] ([facility])   Anion Gap 5 0 - 18 mmol/L [facility]) 05/20/20 1800    Specimen:  Blood,peripheral      Blood Culture Result No Growth 4 Days    Blood Culture FREQ X 2 [[id number]] Collected:  05/16/20 1704    Order Status:  Completed Lab Status:  Preliminary result Updated:  05/20/20 1800    Specimen:  Blood, Ham Miller Patient Status:  Inpatient    1927 MRN [id number]   Location [facility] 5SW/SE Attending Raquel Park MD   Hosp Day # 0 PCP Juan Vicente MD     Date:  5/15/2020  Date of Admission:  2020    History provided by:[KY Penicillin G                Comment:[facility] All[facility] Text Annotation: penicillin  Pantoprazole Sodium 40 MG Oral Tab EC, Take 40 mg by mouth every morning before breakfast.  atorvastatin 20 MG Oral Tab, Take 1 tablet by mouth nightly.     Ascorbic Acid (VITAM Pulmonary/Chest:[KY.1] Effort normal[KY. 2] and[KY. 1] breath sounds normal[KY.2]. Abdominal:[KY.1] Soft[KY. 2]. There is[KY. 1] no tenderness[KY. 2]. Neurological: He is[KY. 1] alert[KY. 2]. Skin: Skin is[KY. 1] warm[KY. 2]. There is[KY. 1] pallor[KY. 2].    Ps unchanged from previous, probably benign.  5. Small cystic lesion within the head of the pancreas measuring 1.4 x 1.2 cm similar to prior exam.  The lesion partially obscured by streak artifact related the patient's left arm scanned along the side of the marilee Upper gi bleed/intractable vomiting[KY. 3]  IVFs[KY. 2]/ppi/iv zofran[KY. 3]  GI consult   CT Large retrocardiac hiatal hernia with gastric distention. Sick Sinus Syndrome  S/p Pacemaker - stable - monitor     Hypotension  IVFs[KY. 2]/monitor[KY. 3]     Pa - 2U pRBCs being given now  - trend H+H  - goal hemoglobin >7, platelets >59 and INR <1.5  - hold all anticoagulants and antiplatelet therapy  3. GI bleed  - IV pantoprazole  - carafate  - per GI  4. CKD - stable  - monitor  5.  FEN  - diet per GI  6. PPx acetaminophen 325 MG Oral Tab, Take 2 tablets (650 mg total) by mouth every 4 (four) hours as needed. , Disp: 30 tablet, Rfl: 0, Taking      Pantoprazole Sodium 40 MG Oral Tab EC, Take 40 mg by mouth every morning before breakfast., Disp: , Rfl:   atorvasta Author:  Felipe Chan PT Service:  Clinical Therapist Author Type:  Physical Therapist    Filed:  5/19/2020 11:04 AM Date of Service:  5/19/2020 10:40 AM Status:  Signed    :  Felipe Chan PT (Physical Therapist)       PHYSICAL THERAPY TREATMENT N DISCHARGE RECOMMENDATIONS[MD.1]  PT Discharge Recommendations: Home with home health PT;24 hour care/supervision[MD.2]     PLAN[MD.1]  PT Treatment Plan: Endurance; Energy conservation;Patient education;Gait training;Neuromuscular re-educate;Strengthening;T Pattern: L Steppage;R Steppage; Shuffle  Stoop/Curb Assistance: Not tested[MD.2]       Additional information: Education provided on lower extremity exercises; patient verbalized understanding of instructions[MD.1]      Patient End of Session: Up in chair;N Video Swallow Study Notes    No notes of this type exist for this encounter.         SLP Note - SLP Notes      SLP Note signed by COURTNEY Jett at 5/21/2020 11:39 AM  Version 1 of 1    Author:  COURTNEY Jett Service:  Rehab Author Type:  DILLAN Trace[SP. 1]-minimal[SP.2] lingual stasis was cleared with a dry multiple swallow. [SP.1] Cues provided to pt to alternate consistencies with a liquid wash.[SP.2]  Pharyngeal phase of swallow appears delayed[SP.1] about 2 seconds.   During the swallow, s[SP.2 Interdisciplinary Communication: Discussed with RN          GOALS  Goal #1 The patient will tolerate general[SP.1] soft[SP.2] diet consistency and nectar thick liquids without overt signs or symptoms of aspiration with 100 % accuracy over 3 session(s). Moderate-max cues presented with dysphagia exercises. Laryngeal Adductions: x10[SP. 1] with 60% accuracy[SP. 2]  Tongue Base: x10[SP. 1] with 70% accuracy[SP.2]  Falsetto maneuvers: x[SP.1] 10 with 40% accuracy[SP.2]  Effortful swallow[SP.1] 3 out of 5 w - See additional Care Plan goals for specific interventions

## (undated) NOTE — IP AVS SNAPSHOT
[facility]            (For Outpatient Use Only) Initial Admit Date: 5/14/2020   Inpt/Obs Admit Date: Inpt: 5/15/20 / Obs: N/A   Discharge Date:    Arvind Sullivan:  [de-identified]   MRN: [de-identified]   CSN: [id number]   CEID: [id number]        KTO Subscriber Name:  Dayan Livingston :    Subscriber ID:  Pt Rel to Subscriber:    Hospital Account Financial Class: Medicare    May 21, 2020

## (undated) NOTE — LETTER
[address], [place]  Authorization for Invasive Procedures  1.  I hereby authorize Dr. Jami San, my physician and whomever may be designated as the doctor's assistant, to perform the following operation and/or proced 5. I consent to the photographing of the operations or procedures to be performed for the purposes of advancing medicine, science, and/or education, provided my identity is not revealed.  If the procedure has been videotaped, the physician/surgeon will obta __________ Time: ___________    Statement of Physician  My signature below affirms that prior to the time of the procedure, I have explained to the patient and/or his legal representative, the risks and benefits involved in the proposed treatment and any r